# Patient Record
Sex: FEMALE | Race: WHITE | Employment: UNEMPLOYED | URBAN - METROPOLITAN AREA
[De-identification: names, ages, dates, MRNs, and addresses within clinical notes are randomized per-mention and may not be internally consistent; named-entity substitution may affect disease eponyms.]

---

## 2017-03-24 ENCOUNTER — TELEPHONE (OUTPATIENT)
Dept: GASTROENTEROLOGY | Facility: CLINIC | Age: 32
End: 2017-03-24
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
## 2017-03-24 NOTE — TELEPHONE ENCOUNTER
LMTCB. Advised no same day appts. If acute severe symptoms should present to ER. Cancellation spot next week is soonest available. Advised she can call PCP or [facility] to see if sooner available appts.       We do not have any [facility] record to lin

## 2017-03-24 NOTE — TELEPHONE ENCOUNTER
Unfortunately we have never had this patient seen in our clinic; she should contact her [facility] PCP for advice and/or referral to a GI that the PCP suggests and/or go to ER if sx worsen.

## 2017-03-24 NOTE — TELEPHONE ENCOUNTER
Pt states she is in severe pain and has not had a bowel movement. Pt is in tears and requesting to see someone - went to ER already and does not want to go back. Pls call - pt d/c call. Thank you.

## 2017-03-29 ENCOUNTER — OFFICE VISIT (OUTPATIENT)
Dept: OBGYN CLINIC | Facility: CLINIC | Age: 32
End: 2017-03-29

## 2017-03-29 VITALS
HEART RATE: 101 BPM | WEIGHT: 105 LBS | HEIGHT: 60 IN | SYSTOLIC BLOOD PRESSURE: 112 MMHG | DIASTOLIC BLOOD PRESSURE: 82 MMHG | BODY MASS INDEX: 20.62 KG/M2

## 2017-03-29 DIAGNOSIS — F41.9 ANXIETY: ICD-10-CM

## 2017-03-29 DIAGNOSIS — F17.200 SMOKER: ICD-10-CM

## 2017-03-29 DIAGNOSIS — Z30.011 BCP (BIRTH CONTROL PILLS) INITIATION: ICD-10-CM

## 2017-03-29 DIAGNOSIS — Z11.3 SCREEN FOR STD (SEXUALLY TRANSMITTED DISEASE): ICD-10-CM

## 2017-03-29 DIAGNOSIS — Z01.419 WELL FEMALE EXAM WITH ROUTINE GYNECOLOGICAL EXAM: Primary | ICD-10-CM

## 2017-03-29 PROCEDURE — 99395 PREV VISIT EST AGE 18-39: CPT | Performed by: ADVANCED PRACTICE MIDWIFE

## 2017-03-29 RX ORDER — LEVONORGESTREL AND ETHINYL ESTRADIOL 0.1-0.02MG
1 KIT ORAL DAILY
Qty: 1 PACKAGE | Refills: 11 | Status: SHIPPED | OUTPATIENT
Start: 2017-03-29 | End: 2017-04-05
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
## 2017-03-29 NOTE — PATIENT INSTRUCTIONS
ORAL CONTRACEPTIVE PILL INSTRUCTION    The name of my oral contraceptive pill is ____________loestrin 1/20__________________    When do I start my “pills”? 1. today    What time should I take my pill? 1. Take your pill the same time every day.   This will additional contraception? 1. Use a back-up with the first pack of pills  2. Use a back-up if you miss more than one pill in a pack  3. Use a back-up if you have bleeding during your active pills  4.  Use a back-up for safe sexual practice, to prevent disea

## 2017-03-30 LAB
C TRACH DNA SPEC QL NAA+PROBE: NEGATIVE
N GONORRHOEA DNA SPEC QL NAA+PROBE: NEGATIVE

## 2017-03-31 PROBLEM — F43.23 ADJUSTMENT DISORDER WITH MIXED ANXIETY AND DEPRESSED MOOD: Status: ACTIVE | Noted: 2017-03-31

## 2017-03-31 PROBLEM — M54.41 CHRONIC BILATERAL LOW BACK PAIN WITH BILATERAL SCIATICA: Status: ACTIVE | Noted: 2017-03-31

## 2017-03-31 PROBLEM — M51.26 PROTRUDED LUMBAR DISC: Status: ACTIVE | Noted: 2017-03-31

## 2017-03-31 PROBLEM — G89.29 CHRONIC BILATERAL LOW BACK PAIN WITH BILATERAL SCIATICA: Status: ACTIVE | Noted: 2017-03-31

## 2017-03-31 PROBLEM — F12.90 MARIJUANA USE: Status: ACTIVE | Noted: 2017-03-31

## 2017-03-31 PROBLEM — M54.42 CHRONIC BILATERAL LOW BACK PAIN WITH BILATERAL SCIATICA: Status: ACTIVE | Noted: 2017-03-31

## 2017-03-31 PROBLEM — F50.02 ANOREXIA NERVOSA WITH BULIMIA: Status: ACTIVE | Noted: 2017-03-31

## 2017-03-31 LAB — HPV I/H RISK 1 DNA SPEC QL NAA+PROBE: NEGATIVE

## 2017-04-05 ENCOUNTER — TELEPHONE (OUTPATIENT)
Dept: OBGYN CLINIC | Facility: CLINIC | Age: 32
End: 2017-04-05

## 2017-04-05 RX ORDER — LEVONORGESTREL AND ETHINYL ESTRADIOL 0.1-0.02MG
1 KIT ORAL DAILY
Qty: 1 PACKAGE | Refills: 11 | Status: SHIPPED | OUTPATIENT
Start: 2017-04-05 | End: 2018-10-08

## 2017-04-05 NOTE — TELEPHONE ENCOUNTER
Pt went to wrong pharmacy. Wants to change pharmacy to [facility] on [address] [facility]. Pharmacy changed in [address] and prescription sent.

## 2017-05-04 ENCOUNTER — TELEPHONE (OUTPATIENT)
Dept: OBGYN CLINIC | Facility: CLINIC | Age: 32
End: 2017-05-04

## 2017-05-04 NOTE — TELEPHONE ENCOUNTER
Pt returning call would like to khoa w nurse  Pt states she thought she completed all the labs in system pls call her to discuss. Okay to leave detailed vm if no answer.

## 2017-05-16 PROBLEM — K59.02 CONSTIPATION DUE TO PELVIC FLOOR OUTLET OBSTRUCTION: Status: ACTIVE | Noted: 2017-05-16

## 2017-05-16 PROBLEM — N94.89 HIGH-TONE PELVIC FLOOR DYSFUNCTION: Status: ACTIVE | Noted: 2017-05-16

## 2017-08-02 PROBLEM — S56.911A FOREARM STRAIN, RIGHT, INITIAL ENCOUNTER: Status: ACTIVE | Noted: 2017-08-02

## 2017-08-02 PROBLEM — S40.021A ARM CONTUSION, RIGHT, INITIAL ENCOUNTER: Status: ACTIVE | Noted: 2017-08-02

## 2018-10-08 DIAGNOSIS — Z30.011 BCP (BIRTH CONTROL PILLS) INITIATION: ICD-10-CM

## 2018-10-08 RX ORDER — LEVONORGESTREL AND ETHINYL ESTRADIOL 0.1-0.02MG
KIT ORAL
Qty: 28 TABLET | Refills: 0 | Status: SHIPPED | OUTPATIENT
Start: 2018-10-08 | End: 2019-01-24

## 2018-10-08 RX ORDER — LEVONORGESTREL AND ETHINYL ESTRADIOL 0.1-0.02MG
1 KIT ORAL DAILY
Qty: 1 PACKAGE | Refills: 11 | Status: SHIPPED | OUTPATIENT
Start: 2018-10-08 | End: 2019-01-24

## 2018-10-08 NOTE — TELEPHONE ENCOUNTER
Pt's last pap 03/29/2018. Refill placed for 1 Lessina.  Pt scheduled annual with [facility]jm for 10/15/2018 at 3:00 pm.

## 2018-10-15 ENCOUNTER — OFFICE VISIT (OUTPATIENT)
Dept: OBGYN CLINIC | Facility: CLINIC | Age: 33
End: 2018-10-15
Payer: COMMERCIAL

## 2018-10-15 VITALS
WEIGHT: 135.63 LBS | SYSTOLIC BLOOD PRESSURE: 112 MMHG | DIASTOLIC BLOOD PRESSURE: 64 MMHG | BODY MASS INDEX: 27.34 KG/M2 | HEIGHT: 59 IN

## 2018-10-15 DIAGNOSIS — N89.8 VAGINAL DISCHARGE: ICD-10-CM

## 2018-10-15 DIAGNOSIS — Z30.41 ENCOUNTER FOR BIRTH CONTROL PILLS MAINTENANCE: ICD-10-CM

## 2018-10-15 DIAGNOSIS — F17.200 TOBACCO USE DISORDER: ICD-10-CM

## 2018-10-15 DIAGNOSIS — Z01.419 WELL WOMAN EXAM WITH ROUTINE GYNECOLOGICAL EXAM: Primary | ICD-10-CM

## 2018-10-15 PROCEDURE — 99395 PREV VISIT EST AGE 18-39: CPT | Performed by: ADVANCED PRACTICE MIDWIFE

## 2018-10-15 PROCEDURE — 99406 BEHAV CHNG SMOKING 3-10 MIN: CPT | Performed by: ADVANCED PRACTICE MIDWIFE

## 2018-10-15 RX ORDER — LEVONORGESTREL AND ETHINYL ESTRADIOL 0.1-0.02MG
1 KIT ORAL DAILY
Qty: 3 PACKAGE | Refills: 4 | Status: SHIPPED | OUTPATIENT
Start: 2018-10-15 | End: 2019-01-24

## 2018-10-15 RX ORDER — CYCLOBENZAPRINE HCL 10 MG
TABLET ORAL
COMMUNITY
End: 2019-01-24

## 2018-10-15 RX ORDER — FLUCONAZOLE 150 MG/1
150 TABLET ORAL ONCE
Qty: 2 TABLET | Refills: 0 | Status: SHIPPED | OUTPATIENT
Start: 2018-10-15 | End: 2018-10-15

## 2018-10-15 NOTE — PROGRESS NOTES
HPI:    Patient ID: Abi Hadley is a 35year old female. Recently  is not in as much stress as last year. Menses normal on Lutera. Still taking Norco once monthly for severe back pain secondary to herniated disc.           Review of Syste Abnormality: See Interpretation/Result      INTERPRETATION/RESULT:   Low grade squamous intraepithelial lesion (LSIL)          HPV/Mild dysplasia/AMBIKA I                                  Fungal organisms morphologically consistent with Candida spp. Cerv/Endocerv/Vag 10/13/2015     Lab Results   Component Value Date    HPV1 POSITIVE (A) 10/13/2015         Current Outpatient Medications:  fluconazole (DIFLUCAN) 150 MG Oral Tab Take 1 tablet (150 mg total) by mouth once for 1 dose.  Disp: 2 tablet Rfl: 0 (DIFLUCAN) 150 MG Oral Tab Take 1 tablet (150 mg total) by mouth once for 1 dose. Disp: 2 tablet Rfl: 0   Levonorgestrel-Ethinyl Estrad (AVIANE) 0.1-20 MG-MCG Oral Tab Take 1 tablet by mouth daily.  Disp: 3 Package Rfl: 4   LESSINA 0.1-20 MG-MCG Oral Tab TA deviated, not enlarged and not tender. Cervix exhibits no motion tenderness, no discharge and no friability. Right adnexum displays no mass, no tenderness and no fullness. Left adnexum displays no mass, no tenderness and no fullness.  No vaginal discharge f

## 2018-10-15 NOTE — PATIENT INSTRUCTIONS
Getting Support for Quitting Smoking    You don’t have to go through the process of quitting smoking without support. Tell people you are quitting. The support of friends, coworkers, and family members can make a big difference.  Face-to-face or telepho Sometimes you may just need to talk when you miss smoking. Ex-smokers are good to talk to, because they’re likely to know how you feel. You may need extra support in the first few weeks after you quit.  Ask a friend to call you each day to see how you’re do

## 2019-01-24 ENCOUNTER — OFFICE VISIT (OUTPATIENT)
Dept: FAMILY MEDICINE CLINIC | Facility: CLINIC | Age: 34
End: 2019-01-24

## 2019-01-24 ENCOUNTER — OFFICE VISIT (OUTPATIENT)
Dept: OBGYN CLINIC | Facility: CLINIC | Age: 34
End: 2019-01-24
Payer: COMMERCIAL

## 2019-01-24 VITALS
RESPIRATION RATE: 12 BRPM | HEART RATE: 83 BPM | TEMPERATURE: 99 F | BODY MASS INDEX: 29.43 KG/M2 | DIASTOLIC BLOOD PRESSURE: 60 MMHG | HEIGHT: 59 IN | WEIGHT: 146 LBS | SYSTOLIC BLOOD PRESSURE: 94 MMHG | OXYGEN SATURATION: 98 %

## 2019-01-24 VITALS
BODY MASS INDEX: 28.47 KG/M2 | HEIGHT: 60 IN | WEIGHT: 145 LBS | SYSTOLIC BLOOD PRESSURE: 122 MMHG | DIASTOLIC BLOOD PRESSURE: 74 MMHG

## 2019-01-24 DIAGNOSIS — J45.998 POST-VIRAL REACTIVE AIRWAY DISEASE: ICD-10-CM

## 2019-01-24 DIAGNOSIS — J18.9 WALKING PNEUMONIA: Primary | ICD-10-CM

## 2019-01-24 DIAGNOSIS — N89.8 VAGINAL DISCHARGE: Primary | ICD-10-CM

## 2019-01-24 DIAGNOSIS — N91.5 OLIGOMENORRHEA, UNSPECIFIED TYPE: ICD-10-CM

## 2019-01-24 LAB
CONTROL LINE PRESENT WITH A CLEAR BACKGROUND (YES/NO): YES YES/NO
PREGNANCY TEST, URINE: NEGATIVE

## 2019-01-24 PROCEDURE — 81025 URINE PREGNANCY TEST: CPT | Performed by: FAMILY MEDICINE

## 2019-01-24 PROCEDURE — 99214 OFFICE O/P EST MOD 30 MIN: CPT | Performed by: FAMILY MEDICINE

## 2019-01-24 PROCEDURE — 99213 OFFICE O/P EST LOW 20 MIN: CPT | Performed by: OBSTETRICS & GYNECOLOGY

## 2019-01-24 RX ORDER — FLUTICASONE PROPIONATE 50 MCG
2 SPRAY, SUSPENSION (ML) NASAL DAILY
Qty: 1 BOTTLE | Refills: 0 | Status: SHIPPED | OUTPATIENT
Start: 2019-01-24 | End: 2019-03-14

## 2019-01-24 RX ORDER — MONTELUKAST SODIUM 10 MG/1
10 TABLET ORAL NIGHTLY
Qty: 30 TABLET | Refills: 0 | Status: SHIPPED | OUTPATIENT
Start: 2019-01-24 | End: 2019-02-20

## 2019-01-24 RX ORDER — BUDESONIDE AND FORMOTEROL FUMARATE DIHYDRATE 80; 4.5 UG/1; UG/1
2 AEROSOL RESPIRATORY (INHALATION) 2 TIMES DAILY
Qty: 1 INHALER | Refills: 0 | Status: SHIPPED | OUTPATIENT
Start: 2019-01-24 | End: 2019-02-20

## 2019-01-24 RX ORDER — AZITHROMYCIN 250 MG/1
TABLET, FILM COATED ORAL
Qty: 6 TABLET | Refills: 0 | Status: SHIPPED | OUTPATIENT
Start: 2019-01-24 | End: 2019-02-07

## 2019-01-24 RX ORDER — PROMETHAZINE HYDROCHLORIDE AND CODEINE PHOSPHATE 6.25; 1 MG/5ML; MG/5ML
5 SYRUP ORAL EVERY 6 HOURS PRN
Qty: 118 ML | Refills: 0 | Status: SHIPPED | OUTPATIENT
Start: 2019-01-24 | End: 2019-02-07

## 2019-01-25 NOTE — PROGRESS NOTES
HPI:    Patient ID: Janee Alexandra is a 35year old female. HPI  Gyne problem  My first time seeing pt  C/o vaginal discharge whitish gray. No odor. No itching or burning. Review of Systems   Constitutional: Negative. Cardiovascular: Negative. Grandfather    • Renal Disease Father         stones   • Diabetes Maternal Grandmother    • Diabetes Paternal Grandmother       Social History: Social History    Tobacco Use      Smoking status: Current Every Day Smoker        Packs/day: 0.50        Years:

## 2019-01-26 LAB
GENITAL VAGINOSIS SCREEN: NEGATIVE
TRICHOMONAS SCREEN: NEGATIVE

## 2019-01-31 NOTE — PROGRESS NOTES
HPI:   Patient presents with:  URI: c/c cough, chest congestion, runny nose on and off since October  No Period/no Cycle: c/c last period was in October per patient she took plan b back to back that month      Awa Urban is a 35year old female pr Medications:    Current Outpatient Medications:  azithromycin 250 MG Oral Tab Take two tablets by mouth today, then one daily. Disp: 6 tablet Rfl: 0   promethazine-codeine 6.25-10 MG/5ML Oral Syrup Take 5 mL by mouth every 6 (six) hours as needed.  Disp Grandmother    • Diabetes Paternal Grandmother           REVIEW OF SYSTEMS:   Review of Systems   Constitutional: Positive for chills. Negative for fatigue and fever. HENT: Positive for congestion, postnasal drip, rhinorrhea and sinus pain.     Respirator zpack, flonase, cough syrup  -start symbicort  -possible allergic component, will do start singulair  -s/p medrol wilman  -if no improvement obtain CXR  -obtain previous records (s/p [facility] x2 including normal CXR per pt)  -supportive care discussed  -red flags Sejal Chaves MD

## 2019-02-07 ENCOUNTER — OFFICE VISIT (OUTPATIENT)
Dept: FAMILY MEDICINE CLINIC | Facility: CLINIC | Age: 34
End: 2019-02-07

## 2019-02-07 VITALS
BODY MASS INDEX: 29.06 KG/M2 | RESPIRATION RATE: 12 BRPM | OXYGEN SATURATION: 97 % | TEMPERATURE: 99 F | HEART RATE: 93 BPM | DIASTOLIC BLOOD PRESSURE: 60 MMHG | WEIGHT: 148 LBS | SYSTOLIC BLOOD PRESSURE: 110 MMHG | HEIGHT: 60 IN

## 2019-02-07 DIAGNOSIS — J30.9 ALLERGIC RHINITIS WITH POSTNASAL DRIP: Primary | ICD-10-CM

## 2019-02-07 DIAGNOSIS — R09.82 ALLERGIC RHINITIS WITH POSTNASAL DRIP: Primary | ICD-10-CM

## 2019-02-07 PROCEDURE — 99213 OFFICE O/P EST LOW 20 MIN: CPT

## 2019-02-07 RX ORDER — LORATADINE 10 MG/1
10 TABLET ORAL DAILY
Qty: 30 TABLET | Refills: 0 | Status: SHIPPED | OUTPATIENT
Start: 2019-02-07 | End: 2019-03-14

## 2019-02-07 RX ORDER — LEVONORGESTREL AND ETHINYL ESTRADIOL 0.1-0.02MG
KIT ORAL
Refills: 3 | COMMUNITY
Start: 2019-02-04 | End: 2019-03-14

## 2019-02-08 NOTE — PROGRESS NOTES
HPI:    Patient ID: Tommy Looney is a 35year old female. Cough   This is a new problem. The current episode started more than 1 month ago. The problem has been unchanged. The problem occurs every few minutes. The cough is non-productive.  Associate Suspension 2 sprays by Each Nare route daily.  Disp: 1 Bottle Rfl: 0     Allergies:  Dander                    Grass                     Pine Bark [Pine]          Pollen                     PHYSICAL EXAM:   Physical Exam   Constitutional: She is oriented to

## 2019-02-20 ENCOUNTER — TELEPHONE (OUTPATIENT)
Dept: FAMILY MEDICINE CLINIC | Facility: CLINIC | Age: 34
End: 2019-02-20

## 2019-02-20 RX ORDER — MONTELUKAST SODIUM 10 MG/1
10 TABLET ORAL NIGHTLY
Qty: 90 TABLET | Refills: 0 | Status: SHIPPED | OUTPATIENT
Start: 2019-02-20 | End: 2019-03-14

## 2019-02-20 RX ORDER — BUDESONIDE AND FORMOTEROL FUMARATE DIHYDRATE 80; 4.5 UG/1; UG/1
2 AEROSOL RESPIRATORY (INHALATION) 2 TIMES DAILY
Qty: 1 INHALER | Refills: 0 | Status: SHIPPED | OUTPATIENT
Start: 2019-02-20 | End: 2019-03-14

## 2019-02-25 ENCOUNTER — LAB ENCOUNTER (OUTPATIENT)
Dept: LAB | Facility: HOSPITAL | Age: 34
End: 2019-02-25
Attending: FAMILY MEDICINE
Payer: COMMERCIAL

## 2019-02-25 DIAGNOSIS — N92.6 IRREGULAR MENSTRUAL CYCLE: ICD-10-CM

## 2019-02-25 DIAGNOSIS — Z00.00 HEALTHCARE MAINTENANCE: ICD-10-CM

## 2019-02-25 DIAGNOSIS — R61 NIGHT SWEATS: ICD-10-CM

## 2019-02-25 DIAGNOSIS — N93.9 ABNORMAL UTERINE BLEEDING (AUB): ICD-10-CM

## 2019-02-25 DIAGNOSIS — Z13.6 SCREENING FOR CARDIOVASCULAR CONDITION: ICD-10-CM

## 2019-02-25 LAB
ALBUMIN SERPL-MCNC: 4.3 G/DL (ref 3.4–5)
ALBUMIN/GLOB SERPL: 1.1 {RATIO} (ref 1–2)
ALP LIVER SERPL-CCNC: 60 U/L (ref 37–98)
ALT SERPL-CCNC: 18 U/L (ref 13–56)
ANION GAP SERPL CALC-SCNC: 5 MMOL/L (ref 0–18)
AST SERPL-CCNC: 12 U/L (ref 15–37)
BILIRUB SERPL-MCNC: 0.6 MG/DL (ref 0.1–2)
BUN BLD-MCNC: 13 MG/DL (ref 7–18)
BUN/CREAT SERPL: 13.1 (ref 10–20)
CALCIUM BLD-MCNC: 9.3 MG/DL (ref 8.5–10.1)
CHLORIDE SERPL-SCNC: 107 MMOL/L (ref 98–107)
CHOLEST SMN-MCNC: 179 MG/DL (ref ?–200)
CO2 SERPL-SCNC: 27 MMOL/L (ref 21–32)
CREAT BLD-MCNC: 0.99 MG/DL (ref 0.55–1.02)
DEPRECATED RDW RBC AUTO: 41.1 FL (ref 35.1–46.3)
ERYTHROCYTE [DISTWIDTH] IN BLOOD BY AUTOMATED COUNT: 12.7 % (ref 11–15)
EST. AVERAGE GLUCOSE BLD GHB EST-MCNC: 105 MG/DL (ref 68–126)
GLOBULIN PLAS-MCNC: 4 G/DL (ref 2.8–4.4)
GLUCOSE BLD-MCNC: 93 MG/DL (ref 70–99)
HBA1C MFR BLD HPLC: 5.3 % (ref ?–5.7)
HCT VFR BLD AUTO: 45.2 % (ref 35–48)
HDLC SERPL-MCNC: 50 MG/DL (ref 40–59)
HGB BLD-MCNC: 14.8 G/DL (ref 12–16)
LDLC SERPL CALC-MCNC: 117 MG/DL (ref ?–100)
M PROTEIN MFR SERPL ELPH: 8.3 G/DL (ref 6.4–8.2)
MCH RBC QN AUTO: 28.8 PG (ref 26–34)
MCHC RBC AUTO-ENTMCNC: 32.7 G/DL (ref 31–37)
MCV RBC AUTO: 87.9 FL (ref 80–100)
NONHDLC SERPL-MCNC: 129 MG/DL (ref ?–130)
OSMOLALITY SERPL CALC.SUM OF ELEC: 288 MOSM/KG (ref 275–295)
PLATELET # BLD AUTO: 416 10(3)UL (ref 150–450)
POTASSIUM SERPL-SCNC: 4 MMOL/L (ref 3.5–5.1)
RBC # BLD AUTO: 5.14 X10(6)UL (ref 3.8–5.3)
SODIUM SERPL-SCNC: 139 MMOL/L (ref 136–145)
T4 FREE SERPL-MCNC: 1.2 NG/DL (ref 0.8–1.7)
TRIGL SERPL-MCNC: 59 MG/DL (ref 30–149)
TSI SER-ACNC: 4.01 MIU/ML (ref 0.36–3.74)
VLDLC SERPL CALC-MCNC: 12 MG/DL (ref 0–30)
WBC # BLD AUTO: 12.3 X10(3) UL (ref 4–11)

## 2019-02-25 PROCEDURE — 81001 URINALYSIS AUTO W/SCOPE: CPT | Performed by: FAMILY MEDICINE

## 2019-02-25 PROCEDURE — 80061 LIPID PANEL: CPT

## 2019-02-25 PROCEDURE — 86480 TB TEST CELL IMMUN MEASURE: CPT

## 2019-02-25 PROCEDURE — 80053 COMPREHEN METABOLIC PANEL: CPT

## 2019-02-25 PROCEDURE — 83036 HEMOGLOBIN GLYCOSYLATED A1C: CPT

## 2019-02-25 PROCEDURE — 85027 COMPLETE CBC AUTOMATED: CPT

## 2019-02-25 PROCEDURE — 84439 ASSAY OF FREE THYROXINE: CPT

## 2019-02-25 PROCEDURE — 36415 COLL VENOUS BLD VENIPUNCTURE: CPT

## 2019-02-25 PROCEDURE — 84443 ASSAY THYROID STIM HORMONE: CPT

## 2019-02-25 NOTE — PROGRESS NOTES
Did not draw patient's blood. We did not have tubes needed. Only UA was sent our. Patient states she will go to [facility] today for blood work.

## 2019-02-25 NOTE — PROGRESS NOTES
HPI:   Patient presents with:   Anxiety/Panic attack (neurologic): Patient presents for frequent panic attacks  Other: Patient present for possible pre menopause      Ion Law is a 35year old female presenting for:    Irregular menses  -used to Date/Time    GLU 93 02/25/2019 05:03 PM     02/25/2019 05:03 PM    K 4.0 02/25/2019 05:03 PM     02/25/2019 05:03 PM    CO2 27.0 02/25/2019 05:03 PM    CREATSERUM 0.99 02/25/2019 05:03 PM    CA 9.3 02/25/2019 05:03 PM    ALB 4.3 02/25/2019 05:0 Packs/day: 0.50        Years: 12.00        Pack years: 6        Types: Cigarettes      Smokeless tobacco: Never Used      Tobacco comment: has cut down from 1 ppd    Alcohol use: No      Alcohol/week: 0.0 oz    Drug use: No      Comment: marijuana     Fami There is no tenderness. Musculoskeletal: She exhibits no edema. Psychiatric: She has a normal mood and affect. Vitals reviewed. ASSESSMENT AND PLAN:   Patient is a 35year old female who presents primarily presents for:    1.  OLIVA (generalized Refills   • escitalopram 10 MG Oral Tab 30 tablet 1     Sig: Take 1 tablet (10 mg total) by mouth daily. • ALPRAZolam 0.25 MG Oral Tab 15 tablet 0     Sig: Take 1 tablet (0.25 mg total) by mouth daily as needed for Anxiety.        Orders Placed This Encou

## 2019-02-27 ENCOUNTER — TELEPHONE (OUTPATIENT)
Dept: FAMILY MEDICINE CLINIC | Facility: CLINIC | Age: 34
End: 2019-02-27

## 2019-02-27 LAB
M TB IFN-G CD4+ T-CELLS BLD-ACNC: 0.02 IU/ML
M TB TUBERC IFN-G BLD QL: NEGATIVE
M TB TUBERC IGNF/MITOGEN IGNF CONTROL: >10 IU/ML
QUANTIFERON TB1 MINUS NIL: -0.01 IU/ML
QUANTIFERON TB2 MINUS NIL: -0.01 IU/ML

## 2019-02-27 NOTE — TELEPHONE ENCOUNTER
Lab in process is a duplicate orders all results are back      Called pt no answer, left message to call back

## 2019-02-27 NOTE — TELEPHONE ENCOUNTER
Patient called regarding her lab results from last visit, 02/25/2019 and would like to speak to the nurse, patient is swared that one of the test is in process Thank you. negative...

## 2019-02-28 PROBLEM — N93.9 ABNORMAL UTERINE BLEEDING (AUB): Status: ACTIVE | Noted: 2019-02-28

## 2019-02-28 PROBLEM — F41.1 GAD (GENERALIZED ANXIETY DISORDER): Status: ACTIVE | Noted: 2019-02-28

## 2019-03-01 ENCOUNTER — TELEPHONE (OUTPATIENT)
Dept: FAMILY MEDICINE CLINIC | Facility: CLINIC | Age: 34
End: 2019-03-01

## 2019-03-01 NOTE — TELEPHONE ENCOUNTER
Results were discussed with patient and she verbalized understanding, she is aware new Rx will be sent to [facility] as per Dr Latricia Alpers recommendation.

## 2019-03-04 ENCOUNTER — TELEPHONE (OUTPATIENT)
Dept: FAMILY MEDICINE CLINIC | Facility: CLINIC | Age: 34
End: 2019-03-04

## 2019-03-04 DIAGNOSIS — E03.8 SUBCLINICAL HYPOTHYROIDISM: Primary | ICD-10-CM

## 2019-03-04 RX ORDER — LEVOTHYROXINE SODIUM 0.05 MG/1
50 TABLET ORAL
Qty: 90 TABLET | Refills: 0 | Status: SHIPPED | OUTPATIENT
Start: 2019-03-04 | End: 2019-10-08

## 2019-03-04 NOTE — TELEPHONE ENCOUNTER
Patient is calling stating she spoke to Dr. Adry Galindo in regards to her thyroid and was told there will be a medication prescribe. She states she went to the pharmacy but nothing was called in. Can we resend medication.      She is also asking for referral t

## 2019-03-04 NOTE — TELEPHONE ENCOUNTER
Attempted to call pt. No answer. Left VM. Will call again later today or await pt callback. No medication was sent as results need to be disussed w/ pt as she ONLY has subclinical hypothyrodism thus medication is not truly warranted.

## 2019-03-07 ENCOUNTER — OFFICE VISIT (OUTPATIENT)
Dept: OBGYN CLINIC | Facility: CLINIC | Age: 34
End: 2019-03-07

## 2019-03-07 VITALS — SYSTOLIC BLOOD PRESSURE: 112 MMHG | BODY MASS INDEX: 28 KG/M2 | DIASTOLIC BLOOD PRESSURE: 72 MMHG | WEIGHT: 141 LBS

## 2019-03-07 DIAGNOSIS — N92.6 IRREGULAR MENSES: ICD-10-CM

## 2019-03-07 DIAGNOSIS — E03.9 HYPOTHYROIDISM, UNSPECIFIED TYPE: ICD-10-CM

## 2019-03-07 DIAGNOSIS — N91.2 AMENORRHEA: Primary | ICD-10-CM

## 2019-03-07 PROCEDURE — 99214 OFFICE O/P EST MOD 30 MIN: CPT | Performed by: OBSTETRICS & GYNECOLOGY

## 2019-03-07 RX ORDER — HYDROCODONE BITARTRATE AND ACETAMINOPHEN 5; 325 MG/1; MG/1
TABLET ORAL
Refills: 0 | COMMUNITY
Start: 2019-03-01 | End: 2019-06-04

## 2019-03-07 RX ORDER — MEDROXYPROGESTERONE ACETATE 5 MG/1
5 TABLET ORAL DAILY
Qty: 7 TABLET | Refills: 2 | Status: SHIPPED | OUTPATIENT
Start: 2019-03-07 | End: 2019-03-14

## 2019-03-07 NOTE — PROGRESS NOTES
HPI:   Tabatha Rodríguez is a 35year old female who presents for amenorrhea since sep 2018. Pt took multiple preg tests and all negative. Recentdly dx with subclinical hypothyroidsm and on meds. Pt counseled extensively, all questions answered.  Pt req p tablet Rfl: 0   Fluticasone Propionate 50 MCG/ACT Nasal Suspension 2 sprays by Each Nare route daily.  Disp: 1 Bottle Rfl: 0      Past Medical History:   Diagnosis Date   • Arthritis    • H/O colonoscopy 4/19/17    normal, repeat age 48 [facility]   • Herniated di is 27.54 kg/m².    GENERAL: well developed, well nourished,in no apparent distress  SKIN: no rashes,no suspicious lesions  HEENT: atraumatic, normocephalic  NECK: supple,no adenopathy  CHEST: no chest tenderness  LUNGS: clear to auscultation  CARDIO: RRR wi

## 2019-03-13 ENCOUNTER — TELEPHONE (OUTPATIENT)
Dept: OBGYN CLINIC | Facility: CLINIC | Age: 34
End: 2019-03-13

## 2019-03-13 NOTE — TELEPHONE ENCOUNTER
RN returned call to patient. Per patient was prescribed Provera by MEHNAZ to initiate menses. Pt is on day 4 or 5 of taking the medication and is concerned as vaginal bleeding has not started.  This RN advises patient that she can expect bleeding to begin in t

## 2019-03-14 ENCOUNTER — OFFICE VISIT (OUTPATIENT)
Dept: FAMILY MEDICINE CLINIC | Facility: CLINIC | Age: 34
End: 2019-03-14

## 2019-03-14 ENCOUNTER — HOSPITAL ENCOUNTER (OUTPATIENT)
Dept: ULTRASOUND IMAGING | Age: 34
Discharge: HOME | End: 2019-03-14
Attending: FAMILY MEDICINE
Payer: COMMERCIAL

## 2019-03-14 VITALS
HEART RATE: 102 BPM | WEIGHT: 146 LBS | SYSTOLIC BLOOD PRESSURE: 116 MMHG | HEIGHT: 60 IN | TEMPERATURE: 99 F | BODY MASS INDEX: 28.66 KG/M2 | OXYGEN SATURATION: 98 % | DIASTOLIC BLOOD PRESSURE: 74 MMHG

## 2019-03-14 DIAGNOSIS — R09.82 ALLERGIC RHINITIS WITH POSTNASAL DRIP: ICD-10-CM

## 2019-03-14 DIAGNOSIS — E03.8 SUBCLINICAL HYPOTHYROIDISM: ICD-10-CM

## 2019-03-14 DIAGNOSIS — J30.9 ALLERGIC RHINITIS WITH POSTNASAL DRIP: ICD-10-CM

## 2019-03-14 DIAGNOSIS — R05.8 RECURRENT COUGH: Primary | ICD-10-CM

## 2019-03-14 PROCEDURE — 76536 US EXAM OF HEAD AND NECK: CPT | Performed by: FAMILY MEDICINE

## 2019-03-14 PROCEDURE — 99213 OFFICE O/P EST LOW 20 MIN: CPT | Performed by: FAMILY MEDICINE

## 2019-03-14 RX ORDER — FLUTICASONE PROPIONATE 50 MCG
2 SPRAY, SUSPENSION (ML) NASAL DAILY
Qty: 1 BOTTLE | Refills: 0 | Status: SHIPPED | OUTPATIENT
Start: 2019-03-14 | End: 2019-06-04

## 2019-03-14 RX ORDER — LORATADINE 10 MG/1
10 TABLET ORAL DAILY
Qty: 30 TABLET | Refills: 2 | Status: SHIPPED | OUTPATIENT
Start: 2019-03-14 | End: 2019-06-04

## 2019-03-14 NOTE — PROGRESS NOTES
HPI:   Patient presents with:  Cough      Bryan Galan is a 35year old female presenting for:  Cough  -on and off for 4-6mo  -almost daily  -mainly dry  -no f/c, congestion  -some runny nose, postnasal drip  -no GERD sx  -smokes 1/2 pack xday for 1 Quantiferon TB1 minus NIL -0.01 IU/mL    Quantiferon TB2 minus NIL -0.01 IU/mL    Quantiferon TB Mitogen minus NIL >10.00 IU/mL    Quantiferon TB Result Negative Negative   T4, FREE (S)   Result Value Ref Range    Free T4 1.2 0.8 - 1.7 ng/dL       Labs: for malignant neoplasm of cervix 1/20/2014    per NG         Past Surgical History:   Procedure Laterality Date   • D & C  2016   • ELECTROCARDIOGRAM, COMPLETE  1/20/2014    scan to media tab       [facility].   Left Ear: Tympanic membrane normal.   Nose: Nose normal.   Mouth/Throat: Oropharynx is clear and moist.   Eyes: Conjunctivae are normal. Pupils are equal, round, and reactive to light.    Cardiovascular: Normal rate, regular rhythm and no 09/01/2018  Tobacco Cessation Counseling 2 Years due on 03/29/2019  Annual Depression Screen due on 10/15/2019  Annual Physical due on 10/15/2019  Pap Smear,3 Years due on 10/15/2021      Marianna Yepez MD

## 2019-03-18 ENCOUNTER — TELEPHONE (OUTPATIENT)
Dept: FAMILY MEDICINE CLINIC | Facility: CLINIC | Age: 34
End: 2019-03-18

## 2019-04-03 DIAGNOSIS — F41.1 GAD (GENERALIZED ANXIETY DISORDER): ICD-10-CM

## 2019-04-03 DIAGNOSIS — E03.8 SUBCLINICAL HYPOTHYROIDISM: ICD-10-CM

## 2019-04-03 RX ORDER — ESCITALOPRAM OXALATE 10 MG/1
TABLET ORAL
Qty: 30 TABLET | Refills: 1 | OUTPATIENT
Start: 2019-04-03

## 2019-04-03 RX ORDER — LEVOTHYROXINE SODIUM 0.05 MG/1
50 TABLET ORAL
Qty: 90 TABLET | Refills: 0 | OUTPATIENT
Start: 2019-04-03

## 2019-04-04 ENCOUNTER — TELEPHONE (OUTPATIENT)
Dept: FAMILY MEDICINE CLINIC | Facility: CLINIC | Age: 34
End: 2019-04-04

## 2019-04-04 NOTE — TELEPHONE ENCOUNTER
Call pt regarding med refill. informed pt that she would need to make a f/u apt with Dr Merrilee Osgood. Pt verbalized understanding and will call back to schedule apt.

## 2019-05-21 ENCOUNTER — TELEPHONE (OUTPATIENT)
Dept: FAMILY MEDICINE CLINIC | Facility: CLINIC | Age: 34
End: 2019-05-21

## 2019-06-04 ENCOUNTER — OFFICE VISIT (OUTPATIENT)
Dept: FAMILY MEDICINE CLINIC | Facility: CLINIC | Age: 34
End: 2019-06-04

## 2019-06-04 VITALS
HEART RATE: 98 BPM | WEIGHT: 140 LBS | SYSTOLIC BLOOD PRESSURE: 100 MMHG | BODY MASS INDEX: 27.48 KG/M2 | OXYGEN SATURATION: 99 % | HEIGHT: 60 IN | DIASTOLIC BLOOD PRESSURE: 60 MMHG

## 2019-06-04 DIAGNOSIS — N93.9 ABNORMAL UTERINE BLEEDING (AUB): Primary | ICD-10-CM

## 2019-06-04 DIAGNOSIS — E03.8 SUBCLINICAL HYPOTHYROIDISM: ICD-10-CM

## 2019-06-04 DIAGNOSIS — N92.1 MENORRHAGIA WITH IRREGULAR CYCLE: ICD-10-CM

## 2019-06-04 PROCEDURE — 99214 OFFICE O/P EST MOD 30 MIN: CPT | Performed by: FAMILY MEDICINE

## 2019-06-04 PROCEDURE — 36415 COLL VENOUS BLD VENIPUNCTURE: CPT | Performed by: FAMILY MEDICINE

## 2019-06-04 PROCEDURE — 84481 FREE ASSAY (FT-3): CPT | Performed by: FAMILY MEDICINE

## 2019-06-04 PROCEDURE — 84443 ASSAY THYROID STIM HORMONE: CPT | Performed by: FAMILY MEDICINE

## 2019-06-04 PROCEDURE — 81025 URINE PREGNANCY TEST: CPT | Performed by: FAMILY MEDICINE

## 2019-06-04 RX ORDER — MONTELUKAST SODIUM 10 MG/1
10 TABLET ORAL NIGHTLY
Qty: 90 TABLET | Refills: 0 | Status: SHIPPED | OUTPATIENT
Start: 2019-06-04 | End: 2019-09-24

## 2019-06-04 NOTE — PROGRESS NOTES
HPI:   Patient presents with:  Irregular Periods: still having irregular bleeding      Jamal Child is a 29year old female presenting for:    Irregular cycle  -initially no cycle for 6mo and used Provera on 3/19  -since being on provera has bee Take 1 tablet (50 mcg total) by mouth before breakfast. Disp: 90 tablet Rfl: 0   ALPRAZolam 0.25 MG Oral Tab Take 1 tablet (0.25 mg total) by mouth daily as needed for Anxiety.  Disp: 15 tablet Rfl: 0   ibuprofen 600 MG Oral Tab Take 1 tablet (600 mg total) Gastrointestinal: Negative for abdominal pain, constipation, diarrhea and vomiting. Genitourinary: Positive for menstrual problem and vaginal bleeding. Neurological: Negative for headaches.           PHYSICAL EXAM:   /60   Pulse 98   Ht 60\"   W complications from the treatments . Red flags/ ER precautions discussed.     Meds & Refills for this Visit:  Requested Prescriptions     Signed Prescriptions Disp Refills   • Montelukast Sodium 10 MG Oral Tab 90 tablet 0     Sig: Take 1 tablet (10 mg total

## 2019-06-06 ENCOUNTER — HOSPITAL ENCOUNTER (EMERGENCY)
Facility: HOSPITAL | Age: 34
Discharge: HOME | End: 2019-06-06
Attending: PHYSICIAN ASSISTANT
Payer: COMMERCIAL

## 2019-06-06 ENCOUNTER — TELEPHONE (OUTPATIENT)
Dept: FAMILY MEDICINE CLINIC | Facility: CLINIC | Age: 34
End: 2019-06-06

## 2019-06-06 ENCOUNTER — APPOINTMENT (OUTPATIENT)
Dept: ULTRASOUND IMAGING | Facility: HOSPITAL | Age: 34
End: 2019-06-06
Attending: PHYSICIAN ASSISTANT
Payer: COMMERCIAL

## 2019-06-06 ENCOUNTER — TELEPHONE (OUTPATIENT)
Dept: OBGYN CLINIC | Facility: CLINIC | Age: 34
End: 2019-06-06

## 2019-06-06 VITALS
WEIGHT: 140 LBS | RESPIRATION RATE: 18 BRPM | SYSTOLIC BLOOD PRESSURE: 101 MMHG | TEMPERATURE: 97 F | BODY MASS INDEX: 27.48 KG/M2 | DIASTOLIC BLOOD PRESSURE: 71 MMHG | HEIGHT: 60 IN | OXYGEN SATURATION: 99 % | HEART RATE: 66 BPM

## 2019-06-06 DIAGNOSIS — N39.0 URINARY TRACT INFECTION WITH HEMATURIA, SITE UNSPECIFIED: ICD-10-CM

## 2019-06-06 DIAGNOSIS — N83.201 RIGHT OVARIAN CYST: ICD-10-CM

## 2019-06-06 DIAGNOSIS — N93.9 ABNORMAL UTERINE BLEEDING: Primary | ICD-10-CM

## 2019-06-06 DIAGNOSIS — R31.9 URINARY TRACT INFECTION WITH HEMATURIA, SITE UNSPECIFIED: ICD-10-CM

## 2019-06-06 PROCEDURE — 93975 VASCULAR STUDY: CPT | Performed by: PHYSICIAN ASSISTANT

## 2019-06-06 PROCEDURE — 86900 BLOOD TYPING SEROLOGIC ABO: CPT | Performed by: PHYSICIAN ASSISTANT

## 2019-06-06 PROCEDURE — 87086 URINE CULTURE/COLONY COUNT: CPT | Performed by: PHYSICIAN ASSISTANT

## 2019-06-06 PROCEDURE — 87808 TRICHOMONAS ASSAY W/OPTIC: CPT | Performed by: PHYSICIAN ASSISTANT

## 2019-06-06 PROCEDURE — 87205 SMEAR GRAM STAIN: CPT | Performed by: PHYSICIAN ASSISTANT

## 2019-06-06 PROCEDURE — 87106 FUNGI IDENTIFICATION YEAST: CPT | Performed by: PHYSICIAN ASSISTANT

## 2019-06-06 PROCEDURE — 85025 COMPLETE CBC W/AUTO DIFF WBC: CPT | Performed by: PHYSICIAN ASSISTANT

## 2019-06-06 PROCEDURE — 76830 TRANSVAGINAL US NON-OB: CPT | Performed by: PHYSICIAN ASSISTANT

## 2019-06-06 PROCEDURE — 80048 BASIC METABOLIC PNL TOTAL CA: CPT | Performed by: PHYSICIAN ASSISTANT

## 2019-06-06 PROCEDURE — 81001 URINALYSIS AUTO W/SCOPE: CPT | Performed by: PHYSICIAN ASSISTANT

## 2019-06-06 PROCEDURE — 81025 URINE PREGNANCY TEST: CPT

## 2019-06-06 PROCEDURE — 96374 THER/PROPH/DIAG INJ IV PUSH: CPT

## 2019-06-06 PROCEDURE — 76856 US EXAM PELVIC COMPLETE: CPT | Performed by: PHYSICIAN ASSISTANT

## 2019-06-06 PROCEDURE — 99284 EMERGENCY DEPT VISIT MOD MDM: CPT

## 2019-06-06 PROCEDURE — 87491 CHLMYD TRACH DNA AMP PROBE: CPT | Performed by: PHYSICIAN ASSISTANT

## 2019-06-06 PROCEDURE — 86901 BLOOD TYPING SEROLOGIC RH(D): CPT | Performed by: PHYSICIAN ASSISTANT

## 2019-06-06 PROCEDURE — 87591 N.GONORRHOEAE DNA AMP PROB: CPT | Performed by: PHYSICIAN ASSISTANT

## 2019-06-06 PROCEDURE — 86850 RBC ANTIBODY SCREEN: CPT | Performed by: PHYSICIAN ASSISTANT

## 2019-06-06 PROCEDURE — 96361 HYDRATE IV INFUSION ADD-ON: CPT

## 2019-06-06 RX ORDER — IBUPROFEN 600 MG/1
TABLET ORAL
Qty: 20 TABLET | Refills: 0 | Status: SHIPPED | OUTPATIENT
Start: 2019-06-06

## 2019-06-06 RX ORDER — CEPHALEXIN 500 MG/1
500 CAPSULE ORAL 3 TIMES DAILY
Qty: 21 CAPSULE | Refills: 0 | Status: SHIPPED | OUTPATIENT
Start: 2019-06-06 | End: 2019-06-13

## 2019-06-06 RX ORDER — KETOROLAC TROMETHAMINE 30 MG/ML
30 INJECTION, SOLUTION INTRAMUSCULAR; INTRAVENOUS ONCE
Status: COMPLETED | OUTPATIENT
Start: 2019-06-06 | End: 2019-06-06

## 2019-06-06 RX ORDER — MEDROXYPROGESTERONE ACETATE 10 MG/1
10 TABLET ORAL DAILY
Qty: 10 TABLET | Refills: 0 | Status: SHIPPED | OUTPATIENT
Start: 2019-06-06 | End: 2019-06-16

## 2019-06-06 NOTE — TELEPHONE ENCOUNTER
Per pt she has been bleeding continuous for the past 2 months, states now is changing her tampon every 20 min and states feels weak.  Please advise

## 2019-06-06 NOTE — TELEPHONE ENCOUNTER
Per patient she is soaked in blood and is bleeding profusely. Pads/tampons completely soaked. Patient very weak and feels faint. Was going to advise ER, patient states she is already on her way.

## 2019-06-06 NOTE — ED PROVIDER NOTES
Patient Seen in: [facility] AND [facility] Emergency Department    History   Patient presents with:  Eval-G (genital)    Stated Complaint: vag bleed    HPI    Tony Malin is a 29year old female who presents with chief complaint of vaginal bleeding.   O Paternal Grandfather    • Renal Disease Father         stones   • Diabetes Maternal Grandmother    • Diabetes Paternal Grandmother        Social History    Tobacco Use      Smoking status: Current Every Day Smoker        Packs/day: 0.50        Years: 12.00 Capillary refill is brisk. No extremity edema. Gastrointestinal: Positive tenderness palpation at suprapubic area. Abdomen soft, nondistended. There is no rebound tenderness or guarding. No organomegaly is noted. No peritoneal signs.   Normal bowel soun Please view results for these tests on the individual orders. TYPE AND SCREEN    Narrative: The following orders were created for panel order TYPE AND SCREEN.   Procedure                               Abnormality         Status infection with hematuria, site unspecified  Right ovarian cyst    Disposition:  Discharge    Follow-up:  Samantha Irwin, [address] [facility] [id number]    Schedule an appointment as soon as possible for a visit in 2 days

## 2019-06-06 NOTE — TELEPHONE ENCOUNTER
Patient called for results, results below given        Notes recorded by Samantha Irwin MD on 6/5/2019 at 7:09 PM CDT  Please let her know:    Your thyroid levels are normal.  Since you stopped the medication, it looks like its ok for you to con

## 2019-06-06 NOTE — TELEPHONE ENCOUNTER
Has been bleeding continuously for the past two months and a half. Is starting to feel dizzy and weak and believes this may be due to the amount of blood that she is losing. Advised pt to go to ED immediately due to her symptoms.  Pt verbalized understandin

## 2019-06-06 NOTE — ED NOTES
Pt to ER with c/o increased pelvic pain and heavy vaginal bleeding with clots. Pt states she has been having vaginal bleeding for over 2 months. Pt skin pink, warm, and dry. Pt appears anxious and tearful upon RN assessment. Pt c/o dizziness and dyspnea.  P

## 2019-06-11 ENCOUNTER — OFFICE VISIT (OUTPATIENT)
Dept: OBGYN CLINIC | Facility: CLINIC | Age: 34
End: 2019-06-11

## 2019-06-11 VITALS — WEIGHT: 142 LBS | DIASTOLIC BLOOD PRESSURE: 60 MMHG | SYSTOLIC BLOOD PRESSURE: 100 MMHG | BODY MASS INDEX: 28 KG/M2

## 2019-06-11 DIAGNOSIS — N83.202 CYST OF LEFT OVARY: Primary | ICD-10-CM

## 2019-06-11 DIAGNOSIS — R10.2 PELVIC PAIN: ICD-10-CM

## 2019-06-11 DIAGNOSIS — M62.89 PELVIC FLOOR DYSFUNCTION IN FEMALE: ICD-10-CM

## 2019-06-11 PROCEDURE — 99213 OFFICE O/P EST LOW 20 MIN: CPT | Performed by: OBSTETRICS & GYNECOLOGY

## 2019-06-11 NOTE — PROGRESS NOTES
HPI:   Dali Martin is a 29year old female who presents for a hx of pelvic/suprapubic pain, was seen in the er recently for dysfunctional uterine bleeding and was given an rx for provera.       1) UTI:  Started on keflex which the pt was only [place] tablet (50 mcg total) by mouth before breakfast. Disp: 90 tablet Rfl: 0   ALPRAZolam 0.25 MG Oral Tab Take 1 tablet (0.25 mg total) by mouth daily as needed for Anxiety.  Disp: 15 tablet Rfl: 0   medroxyPROGESTERone Acetate (PROVERA) 10 MG Oral Tab Take 1 t anxiety  HEMATOLOGIC: denies hx of anemia  ENDOCRINE: denies thyroid history  ALL/ASTHMA: denies hx of allergy or asthma    EXAM:   /60 (BP Location: Right arm, Patient Position: Sitting, Cuff Size: adult)   Wt 142 lb (64.4 kg)   LMP  (LMP Unknown)

## 2019-06-12 PROBLEM — R10.2 PELVIC PAIN: Status: ACTIVE | Noted: 2019-06-12

## 2019-06-12 PROBLEM — M62.89 PELVIC FLOOR DYSFUNCTION IN FEMALE: Status: ACTIVE | Noted: 2019-06-12

## 2019-06-12 PROBLEM — N83.202 CYST OF LEFT OVARY: Status: ACTIVE | Noted: 2019-06-12

## 2019-09-17 ENCOUNTER — OFFICE VISIT (OUTPATIENT)
Dept: OBGYN CLINIC | Facility: CLINIC | Age: 34
End: 2019-09-17
Payer: COMMERCIAL

## 2019-09-17 ENCOUNTER — APPOINTMENT (OUTPATIENT)
Dept: LAB | Facility: HOSPITAL | Age: 34
End: 2019-09-17
Attending: OBSTETRICS & GYNECOLOGY
Payer: COMMERCIAL

## 2019-09-17 VITALS
BODY MASS INDEX: 26.93 KG/M2 | WEIGHT: 137.19 LBS | DIASTOLIC BLOOD PRESSURE: 76 MMHG | SYSTOLIC BLOOD PRESSURE: 112 MMHG | HEIGHT: 60 IN

## 2019-09-17 DIAGNOSIS — N92.6 IRREGULAR MENSES: ICD-10-CM

## 2019-09-17 DIAGNOSIS — N91.5 HYPOMENORRHEA: ICD-10-CM

## 2019-09-17 DIAGNOSIS — N91.4 SECONDARY OLIGOMENORRHEA: Primary | ICD-10-CM

## 2019-09-17 DIAGNOSIS — N91.4 SECONDARY OLIGOMENORRHEA: ICD-10-CM

## 2019-09-17 DIAGNOSIS — N83.209 CYST OF OVARY, UNSPECIFIED LATERALITY: ICD-10-CM

## 2019-09-17 LAB
B-HCG SERPL-ACNC: <1 MIU/ML
FSH SERPL-ACNC: 4.9 MIU/ML
PROGEST SERPL-MCNC: 2.58 NG/ML
PROLACTIN SERPL-MCNC: 9 NG/ML
TSI SER-ACNC: 4.66 MIU/ML (ref 0.36–3.74)

## 2019-09-17 PROCEDURE — 36415 COLL VENOUS BLD VENIPUNCTURE: CPT

## 2019-09-17 PROCEDURE — 99213 OFFICE O/P EST LOW 20 MIN: CPT | Performed by: OBSTETRICS & GYNECOLOGY

## 2019-09-17 PROCEDURE — 84702 CHORIONIC GONADOTROPIN TEST: CPT

## 2019-09-17 PROCEDURE — 84146 ASSAY OF PROLACTIN: CPT

## 2019-09-17 PROCEDURE — 84144 ASSAY OF PROGESTERONE: CPT

## 2019-09-17 PROCEDURE — 83001 ASSAY OF GONADOTROPIN (FSH): CPT

## 2019-09-17 PROCEDURE — 84443 ASSAY THYROID STIM HORMONE: CPT

## 2019-09-17 RX ORDER — LORATADINE 10 MG/1
TABLET ORAL
COMMUNITY
End: 2019-09-24

## 2019-09-17 RX ORDER — BACLOFEN 20 MG/1
TABLET ORAL
COMMUNITY
End: 2019-09-17

## 2019-09-17 NOTE — PROGRESS NOTES
HPI:    Patient ID: Quinton Desai is a 29year old female. HPI  GYN problem visits. Complains of irregular menstrual cycles, last period on August 15 was very short for her and lights lasting 2 to 3 days. Her usual of 7 days.   She had gone 6 m pressure    • Scoliosis    • Screening for malignant neoplasm of cervix 1/20/2014    per NG      Past Surgical History:   Procedure Laterality Date   • D & C  2016   • ELECTROCARDIOGRAM, COMPLETE  1/20/2014    scan to media tab      Family History   Proble (primary encounter diagnosis)  Hypomenorrhea  Irregular menses    Rule out pregnancy  If negative needs hormonal work-up.   Order pelvic ultrasound as follow-up for ovarian cyst.  Last u/s showed 3 cm simple right     Meds This Visit:  Requested Prescriptio

## 2019-09-18 ENCOUNTER — TELEPHONE (OUTPATIENT)
Dept: OBGYN CLINIC | Facility: CLINIC | Age: 34
End: 2019-09-18

## 2019-09-18 NOTE — TELEPHONE ENCOUNTER
lmtcb    ----- Message from Alen Shane MD sent at 9/18/2019 10:59 AM CDT -----  Please inform that her hormonal testing shows low, underactive thyroid.   If she is taking levothyroxine as indicated in the record at 50 mcg, she should increase the dose

## 2019-09-19 NOTE — TELEPHONE ENCOUNTER
Informed pt of Dr. Saida Diaz message and recommendations below---    --- Message from Miguelito Rojas MD sent at 9/18/2019 10:59 AM CDT -----  Please inform that her hormonal testing shows low, underactive thyroid.   If she is taking levothyroxine as indicat

## 2019-09-24 ENCOUNTER — OFFICE VISIT (OUTPATIENT)
Dept: FAMILY MEDICINE CLINIC | Facility: CLINIC | Age: 34
End: 2019-09-24
Payer: COMMERCIAL

## 2019-09-24 VITALS
DIASTOLIC BLOOD PRESSURE: 60 MMHG | HEART RATE: 98 BPM | SYSTOLIC BLOOD PRESSURE: 100 MMHG | BODY MASS INDEX: 27 KG/M2 | WEIGHT: 137 LBS | OXYGEN SATURATION: 98 % | TEMPERATURE: 98 F

## 2019-09-24 DIAGNOSIS — J01.00 ACUTE NON-RECURRENT MAXILLARY SINUSITIS: Primary | ICD-10-CM

## 2019-09-24 DIAGNOSIS — R09.82 ALLERGIC RHINITIS WITH POSTNASAL DRIP: ICD-10-CM

## 2019-09-24 DIAGNOSIS — J30.9 ALLERGIC RHINITIS WITH POSTNASAL DRIP: ICD-10-CM

## 2019-09-24 PROCEDURE — 99213 OFFICE O/P EST LOW 20 MIN: CPT

## 2019-09-24 RX ORDER — AZITHROMYCIN 250 MG/1
TABLET, FILM COATED ORAL
Qty: 6 TABLET | Refills: 0 | Status: SHIPPED | OUTPATIENT
Start: 2019-09-24 | End: 2019-10-08

## 2019-09-24 RX ORDER — LORATADINE 10 MG/1
10 TABLET ORAL DAILY
Qty: 90 TABLET | Refills: 0 | Status: SHIPPED | OUTPATIENT
Start: 2019-09-24 | End: 2019-12-17 | Stop reason: ALTCHOICE

## 2019-09-24 RX ORDER — MONTELUKAST SODIUM 10 MG/1
10 TABLET ORAL NIGHTLY
Qty: 90 TABLET | Refills: 0 | Status: SHIPPED | OUTPATIENT
Start: 2019-09-24 | End: 2019-11-15 | Stop reason: ALTCHOICE

## 2019-09-24 NOTE — PROGRESS NOTES
HPI:    Patient ID: Roland Bradford is a 29year old female. Cough   This is a new problem. Episode onset: 10 days ago. The problem has been gradually worsening. The problem occurs every few minutes. The cough is productive of sputum.  Associated s with food Disp: 20 tablet Rfl: 0     Allergies:  Dander                    Grass                     Pine Bark [Pine]          Pollen                     PHYSICAL EXAM:   Physical Exam   Constitutional: She is oriented to person, place, and time.  She appea 0     Sig: Take 1 tablet (10 mg total) by mouth daily. • Montelukast Sodium 10 MG Oral Tab 90 tablet 0     Sig: Take 1 tablet (10 mg total) by mouth nightly.        Imaging & Referrals:  None       #[id number]

## 2019-09-27 ENCOUNTER — TELEPHONE (OUTPATIENT)
Dept: FAMILY MEDICINE CLINIC | Facility: CLINIC | Age: 34
End: 2019-09-27

## 2019-09-27 NOTE — TELEPHONE ENCOUNTER
Patient states Dr. Brandi Cook prescribed her the [address] 3 days ago but she's not feeling better and wants Dr. Lonzo Sandifer to send her ( steroids ). Please advise.

## 2019-10-08 ENCOUNTER — HOSPITAL ENCOUNTER (OUTPATIENT)
Dept: ULTRASOUND IMAGING | Age: 34
Discharge: HOME | End: 2019-10-08
Attending: OBSTETRICS & GYNECOLOGY
Payer: COMMERCIAL

## 2019-10-08 DIAGNOSIS — N83.209 CYST OF OVARY, UNSPECIFIED LATERALITY: ICD-10-CM

## 2019-10-08 PROCEDURE — 76830 TRANSVAGINAL US NON-OB: CPT | Performed by: OBSTETRICS & GYNECOLOGY

## 2019-10-08 PROCEDURE — 76856 US EXAM PELVIC COMPLETE: CPT | Performed by: OBSTETRICS & GYNECOLOGY

## 2019-10-08 NOTE — PROGRESS NOTES
HPI:   Patient presents with:   Insomnia: insomnia on and off since 2015  Thyroid Problem: thyroid follow up      Darlene Long is a 29year old female presenting for:    URI/Cold   -Symptoms for  1wk  -Patient reports dry cough,  Intermittent SOB 02/25/2019 05:03 PM    TSH 4.660 (H) 09/17/2019 04:52 PM    T4F 1.2 02/25/2019 05:03 PM          Medications:  predniSONE 20 MG Oral Tab, prednisone 20 mg tablet, Disp: , Rfl:   Fluticasone Propionate 50 MCG/ACT Nasal Suspension, SQUIRT 1 SPRAY IEN BID, Chandan Bonner Tobacco comment: has cut down from 1 ppd    Alcohol use: No      Alcohol/week: 0.0 standard drinks    Drug use: No      Comment: marijuana     Family History:  Family History   Problem Relation Age of Onset   • Diabetes Maternal Grandfather    • Heart At thyromegaly present. Cardiovascular: Normal rate, regular rhythm and normal heart sounds. No murmur heard. Edema not present. Pulmonary/Chest: Effort normal. No respiratory distress. She has wheezes (faint at bases). Abdominal: Soft.  Bowel sounds azithromycin 250 MG Oral Tab 6 tablet 0     Sig: Take two tablets by mouth today, then one daily. • predniSONE 20 MG Oral Tab 10 tablet 0     Sig: Take 2 tablets (40 mg total) by mouth daily for 5 days.    • Levothyroxine Sodium 75 MCG Oral Tab 90 tablet

## 2019-10-09 DIAGNOSIS — F41.1 GAD (GENERALIZED ANXIETY DISORDER): ICD-10-CM

## 2019-10-09 RX ORDER — ALPRAZOLAM 0.25 MG/1
0.25 TABLET ORAL DAILY PRN
Qty: 15 TABLET | Refills: 0 | Status: SHIPPED | OUTPATIENT
Start: 2019-10-09 | End: 2020-03-02

## 2019-10-09 NOTE — TELEPHONE ENCOUNTER
Patient is calling regarding refill on ALPRAZolam 0.25 MG Oral Tab. She stated she forgot to ask Dr. Marek Torres for a refill.

## 2019-10-10 ENCOUNTER — TELEPHONE (OUTPATIENT)
Dept: OBGYN CLINIC | Facility: CLINIC | Age: 34
End: 2019-10-10

## 2019-10-10 NOTE — TELEPHONE ENCOUNTER
Please inform u/s showed rt ovarian cyst is almost gone. Very small now 1.5 cm. Left with tiny 1 cm one. These are not concerning. Nothing further required. May take mild pain meds if desired.

## 2019-10-10 NOTE — TELEPHONE ENCOUNTER
Informed pt of message and Dr. Tomi Cross recommendations below-      Vanessa Hinton MD           10/10/19 10:48 AM   Note      Please inform u/s showed rt ovarian cyst is almost gone. Very small now 1.5 cm. Left with tiny 1 cm one.   These are not concer

## 2019-10-18 PROBLEM — F51.04 PSYCHOPHYSIOLOGICAL INSOMNIA: Status: ACTIVE | Noted: 2019-10-18

## 2019-10-28 ENCOUNTER — OFFICE VISIT (OUTPATIENT)
Dept: FAMILY MEDICINE CLINIC | Facility: CLINIC | Age: 34
End: 2019-10-28
Payer: COMMERCIAL

## 2019-10-28 VITALS
OXYGEN SATURATION: 99 % | HEIGHT: 60 IN | BODY MASS INDEX: 27.33 KG/M2 | SYSTOLIC BLOOD PRESSURE: 106 MMHG | HEART RATE: 90 BPM | WEIGHT: 139.19 LBS | DIASTOLIC BLOOD PRESSURE: 70 MMHG

## 2019-10-28 DIAGNOSIS — J20.9 BRONCHITIS WITH BRONCHOSPASM: Primary | ICD-10-CM

## 2019-10-28 DIAGNOSIS — J45.998 POST-VIRAL REACTIVE AIRWAY DISEASE: ICD-10-CM

## 2019-10-28 PROCEDURE — 99214 OFFICE O/P EST MOD 30 MIN: CPT | Performed by: FAMILY MEDICINE

## 2019-10-28 RX ORDER — PROMETHAZINE HYDROCHLORIDE AND CODEINE PHOSPHATE 6.25; 1 MG/5ML; MG/5ML
5 SOLUTION ORAL EVERY 6 HOURS PRN
Qty: 118 ML | Refills: 0 | Status: SHIPPED | OUTPATIENT
Start: 2019-10-28 | End: 2019-11-11

## 2019-10-28 RX ORDER — ALBUTEROL SULFATE 90 UG/1
1-2 AEROSOL, METERED RESPIRATORY (INHALATION)
Qty: 1 INHALER | Refills: 1 | Status: SHIPPED | OUTPATIENT
Start: 2019-10-28 | End: 2020-03-02

## 2019-10-28 RX ORDER — BUDESONIDE AND FORMOTEROL FUMARATE DIHYDRATE 80; 4.5 UG/1; UG/1
AEROSOL RESPIRATORY (INHALATION)
Qty: 1 INHALER | Refills: 0 | Status: SHIPPED | OUTPATIENT
Start: 2019-10-28 | End: 2020-01-16

## 2019-10-28 NOTE — PROGRESS NOTES
HPI:   Patient presents with: Follow - Up: Patient is here for follow up on ER visit for Bronchitis      Tony Malin is a 29year old female presenting for:    3wks ago seen for a cold.  Was traveling to [facility] and empirically given zpak and ac (L) 02/25/2019 05:03 PM    ALT 18 02/25/2019 05:03 PM    BILT 0.6 02/25/2019 05:03 PM    TSH 4.660 (H) 09/17/2019 04:52 PM    T4F 1.2 02/25/2019 05:03 PM          Medications:  Albuterol Sulfate HFA (PROAIR HFA) 108 (90 Base) MCG/ACT Inhalation Aero Soln, Smoking status: Current Every Day Smoker        Packs/day: 0.50        Years: 12.00        Pack years: 6        Types: Cigarettes      Smokeless tobacco: Never Used      Tobacco comment: has cut down from 1 ppd    Alcohol use: No      Alcohol/week: 0.0 sta She exhibits no tenderness. Abdominal: Soft. Bowel sounds are normal. She exhibits no distension. There is no tenderness. Neurological: No cranial nerve deficit.            ASSESSMENT AND PLAN:   Patient is a 29year old female who presents primarily pr PO BID   • Promethazine-Codeine 6.25-10 MG/5ML Oral Solution 118 mL 0     Sig: Take 5 mL by mouth every 6 (six) hours as needed (cough). No orders of the defined types were placed in this encounter.       Imaging & Consults:  None    [facility] Maintenanc

## 2019-10-29 ENCOUNTER — TELEPHONE (OUTPATIENT)
Dept: FAMILY MEDICINE CLINIC | Facility: CLINIC | Age: 34
End: 2019-10-29

## 2019-10-29 RX ORDER — PREDNISONE 20 MG/1
40 TABLET ORAL DAILY
Qty: 10 TABLET | Refills: 0 | Status: SHIPPED | OUTPATIENT
Start: 2019-10-29 | End: 2019-11-03

## 2019-10-29 NOTE — TELEPHONE ENCOUNTER
Pt called stating that the medications that where prescribed are not helping her. She has a lot more cough then yesterday and can barely take a breath. She asked if she could take a steroid?  Please call back pt and advise

## 2019-10-30 ENCOUNTER — TELEPHONE (OUTPATIENT)
Dept: FAMILY MEDICINE CLINIC | Facility: CLINIC | Age: 34
End: 2019-10-30

## 2019-10-30 NOTE — TELEPHONE ENCOUNTER
PT CALLED BACK STATING THAT SHE IS MISSING HER ANTIBIOTIC? SHE STATED SHE WAS TOLD BY THE DR THAT SHE WAS GOING TO GET AN ANTIBIOTIC.  PLEASE CALL AND ADVISE

## 2019-11-04 ENCOUNTER — TELEPHONE (OUTPATIENT)
Dept: FAMILY MEDICINE CLINIC | Facility: CLINIC | Age: 34
End: 2019-11-04

## 2019-11-04 RX ORDER — AZITHROMYCIN 250 MG/1
TABLET, FILM COATED ORAL
Qty: 6 TABLET | Refills: 0 | Status: SHIPPED | OUTPATIENT
Start: 2019-11-04 | End: 2019-12-17

## 2019-11-04 NOTE — TELEPHONE ENCOUNTER
Ok to send Kimberly Jeffery left message for patient informing her z-wilman had been sent to call us if she was not feeling better

## 2019-11-04 NOTE — TELEPHONE ENCOUNTER
Pt called stating that she still not feeling good, she is done with the steroids but her cough is still there. She is using the inhaler. Pt wants to know if she can get another med prescribed or if she has to come in and see Dr. Cole Mott.  Please call and a

## 2019-11-12 ENCOUNTER — TELEPHONE (OUTPATIENT)
Dept: FAMILY MEDICINE CLINIC | Facility: CLINIC | Age: 34
End: 2019-11-12

## 2019-11-12 NOTE — TELEPHONE ENCOUNTER
Patient is requesting refills for the following medications:    azithromycin (ZITHROMAX Z-GUILLAUME) 250 MG Oral Tab   Promethazine-Codeine 6.25-10 MG/5ML Oral Solution   predniSONE 20 MG Oral Tab     Patient is aware she may need an appointment.

## 2019-11-15 RX ORDER — MONTELUKAST SODIUM 10 MG/1
10 TABLET ORAL DAILY
Qty: 30 TABLET | Refills: 0 | Status: SHIPPED | OUTPATIENT
Start: 2019-11-15

## 2019-11-15 NOTE — TELEPHONE ENCOUNTER
Pt called requesting an appt with dr. Dewey Cifuentes for Monday, but she is completely booked. She wants to know what else to do if her cough is still lingering, she did took z-wilman and it helped her a lot, it decreased her cough but not completely.  Please call

## 2019-11-15 NOTE — TELEPHONE ENCOUNTER
Ok per Dr. Alan Varma to send Singulair 10 mg #30 to take daily. Per patient, her cough is more of a dry cough. Understood medication has been sent, and advised her to drink lots of fluids and run a humidifier in her room as well.    She verbalized understandi

## 2019-12-17 ENCOUNTER — OFFICE VISIT (OUTPATIENT)
Dept: OBGYN CLINIC | Facility: CLINIC | Age: 34
End: 2019-12-17
Payer: COMMERCIAL

## 2019-12-17 ENCOUNTER — OFFICE VISIT (OUTPATIENT)
Dept: FAMILY MEDICINE CLINIC | Facility: CLINIC | Age: 34
End: 2019-12-17
Payer: COMMERCIAL

## 2019-12-17 VITALS
BODY MASS INDEX: 28 KG/M2 | SYSTOLIC BLOOD PRESSURE: 104 MMHG | OXYGEN SATURATION: 97 % | WEIGHT: 142 LBS | HEART RATE: 79 BPM | DIASTOLIC BLOOD PRESSURE: 66 MMHG

## 2019-12-17 VITALS — BODY MASS INDEX: 28 KG/M2 | DIASTOLIC BLOOD PRESSURE: 68 MMHG | SYSTOLIC BLOOD PRESSURE: 116 MMHG | WEIGHT: 141.63 LBS

## 2019-12-17 DIAGNOSIS — N89.8 VAGINAL DISCHARGE: ICD-10-CM

## 2019-12-17 DIAGNOSIS — B35.3 TINEA PEDIS OF BOTH FEET: Primary | ICD-10-CM

## 2019-12-17 DIAGNOSIS — Z01.419 ENCOUNTER FOR WELL WOMAN EXAM WITH ROUTINE GYNECOLOGICAL EXAM: Primary | ICD-10-CM

## 2019-12-17 DIAGNOSIS — M77.12 LATERAL EPICONDYLITIS OF LEFT ELBOW: ICD-10-CM

## 2019-12-17 DIAGNOSIS — N83.202 BILATERAL OVARIAN CYSTS: ICD-10-CM

## 2019-12-17 DIAGNOSIS — R09.82 POSTNASAL DRIP: ICD-10-CM

## 2019-12-17 DIAGNOSIS — N83.201 BILATERAL OVARIAN CYSTS: ICD-10-CM

## 2019-12-17 PROCEDURE — 99395 PREV VISIT EST AGE 18-39: CPT | Performed by: ADVANCED PRACTICE MIDWIFE

## 2019-12-17 PROCEDURE — 99214 OFFICE O/P EST MOD 30 MIN: CPT | Performed by: FAMILY MEDICINE

## 2019-12-17 RX ORDER — FLUTICASONE PROPIONATE 50 MCG
1 SPRAY, SUSPENSION (ML) NASAL DAILY
Qty: 1 BOTTLE | Refills: 1 | Status: SHIPPED | OUTPATIENT
Start: 2019-12-17 | End: 2020-03-20

## 2019-12-17 RX ORDER — FLUCONAZOLE 150 MG/1
TABLET ORAL
Refills: 0 | COMMUNITY
Start: 2019-11-24 | End: 2020-03-02 | Stop reason: ALTCHOICE

## 2019-12-17 RX ORDER — LEVOTHYROXINE SODIUM 0.07 MG/1
TABLET ORAL
COMMUNITY
End: 2019-12-17

## 2019-12-17 RX ORDER — CYCLOBENZAPRINE HCL 10 MG
TABLET ORAL
Refills: 0 | COMMUNITY
Start: 2019-11-26

## 2019-12-17 RX ORDER — CLOTRIMAZOLE 1 %
1 CREAM (GRAM) TOPICAL 2 TIMES DAILY
Qty: 1 TUBE | Refills: 1 | Status: SHIPPED | OUTPATIENT
Start: 2019-12-17 | End: 2020-03-02

## 2019-12-17 RX ORDER — ONDANSETRON 4 MG/1
TABLET, FILM COATED ORAL
COMMUNITY

## 2019-12-17 RX ORDER — ESCITALOPRAM OXALATE 10 MG/1
TABLET ORAL
Refills: 0 | COMMUNITY
Start: 2019-10-09 | End: 2020-03-02

## 2019-12-17 NOTE — PATIENT INSTRUCTIONS
Tennis Elbow  Muscles connect to bones by thick, fibrous cords (tendons). When the muscles are overused by repeated motion, the tendons may become inflamed and painful. This condition is called tendonitis.   Tennis elbow (lateral epicondylitis) is a form night to ease symptoms in the morning. Your healthcare provider may recommend a special wrap or splint to compress the muscles of the forearm. This can ease pain during daytime activities. As your symptoms get better, start to move your elbow more.   · Put All rights reserved. This information is not intended as a substitute for professional medical care. Always follow your healthcare professional's instructions.         Athlete’s Foot    Athlete’s foot (tinea pedis) is caused by a fungal infection in the ski seek medical care  Get medical attention right away if any of the following occur:  · Fever of 100.4°F (38°C) or higher, or as directed  · Increasing redness or swelling of the foot  · Infection comes back soon after treatment  · Pus draining from cracks i

## 2019-12-17 NOTE — PATIENT INSTRUCTIONS
Nettipot solution at bedtime. Use nasal spray after cleansing with nettipot    Can change antihistamine to zyrtec.   Rx sent    Take a Multivitamin daily with 485 mcg of folic acid (folate)

## 2019-12-17 NOTE — PROGRESS NOTES
HPI:    Patient ID: Collette Coffer is a 29year old female. Here for annual exam.  Currently sexually active. Not using any contraception. Not planning a pregnancy but would be ok if she got pregnant.   Has a cyst on her ovary and has had some a intraepithelial lesion or malignancy   Final      Lab Results   Component Value Date    [place] Cerv/Endocerv/Vag 10/13/2015     Lab Results   Component Value Date    HPV1 POSITIVE (A) 10/13/2015       Current Outpatient Medications   Medication Sig Dispens screening 1/20/2014    per NG   • Low blood pressure    • Scoliosis    • Screening for malignant neoplasm of cervix 1/20/2014    per NG      Past Surgical History:   Procedure Laterality Date   • D & C  2016   • ELECTROCARDIOGRAM, COMPLETE  1/20/2014    sc Sulfate HFA (PROAIR HFA) 108 (90 Base) MCG/ACT Inhalation Aero Soln Inhale 1-2 puffs into the lungs every 4 to 6 hours as needed for Wheezing or Shortness of Breath.  1 Inhaler 1   • SYMBICORT 80-4.5 MCG/ACT Inhalation Aerosol INHALE 2 PUFFS PO BID 1 Inhale tenderness or lesion on the left labia. Uterus is not deviated, not enlarged and not tender. Cervix exhibits no motion tenderness, no discharge and no friability. Right adnexum displays no mass, no tenderness and no fullness.  Left adnexum displays no mass,

## 2019-12-17 NOTE — PROGRESS NOTES
CHIEF COMPLAINT: Patient presents with:  Cough: lingering since October  Derm Problem: pt suspects athletes foot; blistering left foot  Pain: left mid deltoid - elbow        HPI:     Huma Busby is a 29year old female presents for cough, athlet' 2016   • ELECTROCARDIOGRAM, COMPLETE  1/20/2014    scan to media tab      Family History   Problem Relation Age of Onset   • Diabetes Maternal Grandfather    • Heart Attack Maternal Grandfather    • Cancer Paternal Grandfather    • Diabetes Paternal Drew Guillermor Tab TK 1 T PO BID PRN  0   • Diclofenac Sodium 1 % Transdermal Gel APPLY TOPICALLY TO PAIN AREAS UTD EVERY DAY TO TWICE DAILY  1   • escitalopram 10 MG Oral Tab TK 1 T PO D  0   • fluconazole 150 MG Oral Tab   0   • Ondansetron HCl (ZOFRAN) 4 mg tablet Zof Cardiovascular: Normal rate, regular rhythm and normal heart sounds. Pulmonary/Chest: Effort normal and breath sounds normal. No respiratory distress. She has no wheezes. She has no rales. Lymphadenopathy:     She has no cervical adenopathy.    Neuro 1      Meds This Visit:  Requested Prescriptions     Signed Prescriptions Disp Refills   • Fluticasone Propionate 50 MCG/ACT Nasal Suspension 1 Bottle 1     Si spray by Nasal route daily.    • Elastic Bandages & Supports (TENNIS ELBOW NEOPRENE BRACE) Do Secondary oligomenorrhea     Hypomenorrhea     Irregular menses     Acute non-recurrent maxillary sinusitis     Psychophysiological insomnia      Imaging & Referrals:  None     12/17/2019  Michael Davis MD      Patient understands plan and follow

## 2020-01-02 ENCOUNTER — TELEPHONE (OUTPATIENT)
Dept: FAMILY MEDICINE CLINIC | Facility: CLINIC | Age: 35
End: 2020-01-02

## 2020-01-02 DIAGNOSIS — E03.8 SUBCLINICAL HYPOTHYROIDISM: ICD-10-CM

## 2020-01-02 DIAGNOSIS — R05.3 COUGH, PERSISTENT: Primary | ICD-10-CM

## 2020-01-02 NOTE — TELEPHONE ENCOUNTER
Patient is calling asking for a referral for an allergy specialist or pulmonary specialist.     Gissell Tello per Dr. Virgen Mcgee to authorized referral.

## 2020-01-03 RX ORDER — LEVOTHYROXINE SODIUM 0.07 MG/1
TABLET ORAL
Qty: 90 TABLET | Refills: 0 | Status: SHIPPED | OUTPATIENT
Start: 2020-01-03 | End: 2020-03-12

## 2020-01-07 NOTE — TELEPHONE ENCOUNTER
Patient is returning MA's phone call, patient verbalized understanding and will  to schedule an appt.

## 2020-01-07 NOTE — TELEPHONE ENCOUNTER
Referral entered and message left for patient to call Dr Janessa Bean office to schedule an appointment and his info was provided on the voicemail.

## 2020-01-16 ENCOUNTER — TELEPHONE (OUTPATIENT)
Dept: FAMILY MEDICINE CLINIC | Facility: CLINIC | Age: 35
End: 2020-01-16

## 2020-01-16 DIAGNOSIS — J45.998 POST-VIRAL REACTIVE AIRWAY DISEASE: ICD-10-CM

## 2020-01-16 DIAGNOSIS — J20.9 BRONCHITIS WITH BRONCHOSPASM: ICD-10-CM

## 2020-01-16 RX ORDER — BUDESONIDE AND FORMOTEROL FUMARATE DIHYDRATE 80; 4.5 UG/1; UG/1
AEROSOL RESPIRATORY (INHALATION)
Qty: 1 INHALER | Refills: 0 | Status: SHIPPED | OUTPATIENT
Start: 2020-01-16 | End: 2020-03-02

## 2020-02-21 ENCOUNTER — TELEPHONE (OUTPATIENT)
Dept: FAMILY MEDICINE CLINIC | Facility: CLINIC | Age: 35
End: 2020-02-21

## 2020-02-21 NOTE — TELEPHONE ENCOUNTER
Pt called stating that she was in the ER at [facility] two days ago for the severe pain that she has on her neck that goes down to her shoulders and left arm.  She only had xrays done and was told that she has nothing and was sent home with Ibuprofen 6

## 2020-02-21 NOTE — TELEPHONE ENCOUNTER
appt given to see Dr Merrilee Osgood on 03/02/2020, she was highly recommended to see Ortho but according to her she already called them and they need her to get an MRI ordered by her PCP.

## 2020-03-02 ENCOUNTER — OFFICE VISIT (OUTPATIENT)
Dept: FAMILY MEDICINE CLINIC | Facility: CLINIC | Age: 35
End: 2020-03-02
Payer: COMMERCIAL

## 2020-03-02 VITALS
OXYGEN SATURATION: 92 % | HEART RATE: 101 BPM | WEIGHT: 140 LBS | DIASTOLIC BLOOD PRESSURE: 66 MMHG | BODY MASS INDEX: 27 KG/M2 | SYSTOLIC BLOOD PRESSURE: 104 MMHG

## 2020-03-02 DIAGNOSIS — M77.12 LATERAL EPICONDYLITIS OF LEFT ELBOW: Primary | ICD-10-CM

## 2020-03-02 DIAGNOSIS — J45.998 POST-VIRAL REACTIVE AIRWAY DISEASE: ICD-10-CM

## 2020-03-02 DIAGNOSIS — E03.8 SUBCLINICAL HYPOTHYROIDISM: ICD-10-CM

## 2020-03-02 DIAGNOSIS — R09.82 POSTNASAL DRIP: ICD-10-CM

## 2020-03-02 DIAGNOSIS — F17.200 SMOKER: ICD-10-CM

## 2020-03-02 DIAGNOSIS — Z00.00 HEALTHCARE MAINTENANCE: ICD-10-CM

## 2020-03-02 PROCEDURE — 99214 OFFICE O/P EST MOD 30 MIN: CPT | Performed by: FAMILY MEDICINE

## 2020-03-02 PROCEDURE — 99406 BEHAV CHNG SMOKING 3-10 MIN: CPT | Performed by: FAMILY MEDICINE

## 2020-03-02 RX ORDER — ALBUTEROL SULFATE 90 UG/1
1-2 AEROSOL, METERED RESPIRATORY (INHALATION)
Qty: 1 INHALER | Refills: 1 | Status: SHIPPED | OUTPATIENT
Start: 2020-03-02

## 2020-03-02 RX ORDER — BUPROPION HYDROCHLORIDE 150 MG/1
TABLET, EXTENDED RELEASE ORAL
Qty: 30 TABLET | Refills: 1 | Status: SHIPPED | OUTPATIENT
Start: 2020-03-02

## 2020-03-03 NOTE — PROGRESS NOTES
HPI:   Patient presents with:  Elbow Pain: left elbow      Huma Busby is a 29year old female presenting for:  Elbow pain  -continued pain  -no improvement  -works as stylist thus struggling with ROM  -wearing brace and doing supportive care a the lungs every 4 to 6 hours as needed for Wheezing or Shortness of Breath.  1 Inhaler 1   • LEVOTHYROXINE SODIUM 75 MCG Oral Tab TAKE 1 TABLET BY MOUTH DAILY BEFORE BREAKFAST 90 tablet 0   • Fluticasone Propionate 50 MCG/ACT Nasal Suspension 1 spray by Ollie Grandfather    • Heart Attack Maternal Grandfather    • Cancer Paternal Grandfather    • Diabetes Paternal Grandfather    • Renal Disease Father         stones   • Diabetes Maternal Grandmother    • Diabetes Paternal Grandmother           REVIEW OF SYSTEMS year old female who presents primarily presents for:    Diagnoses and all orders for this visit:    Lateral epicondylitis of left elbow  -     Cancel: ORTHOPEDIC - INTERNAL  -     ORTHOPEDIC - INTERNAL  -continue supportive care  -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        Orders Placed This Encounter      CBC, Platelet, No Differential [E]      Comp Metabolic Panel (14) [E]      Lipid Panel [E]      TSH W Reflex To Free T4 [E]

## 2020-03-07 ENCOUNTER — TELEPHONE (OUTPATIENT)
Dept: OBGYN CLINIC | Facility: CLINIC | Age: 35
End: 2020-03-07

## 2020-03-07 NOTE — TELEPHONE ENCOUNTER
As noted by KAVITA, pt made apt through my chart for pain and \"possible ruptured ovarin cyst\"    Left vm to inform to go to ER over the weekend with worsening s/o also left my chart message.

## 2020-03-08 ENCOUNTER — PATIENT MESSAGE (OUTPATIENT)
Dept: FAMILY MEDICINE CLINIC | Facility: CLINIC | Age: 35
End: 2020-03-08

## 2020-03-09 NOTE — TELEPHONE ENCOUNTER
Results regarding her A1c from 02/25/2019 , vaginal screening from 06/06/2019 were discussed with Lady Fairchildudder, all questions were answered and she verbalized understanding.   Regarding her cough she was recommended not to use any kind of antibiotics and or steroid

## 2020-03-10 ENCOUNTER — TELEPHONE (OUTPATIENT)
Dept: OBGYN CLINIC | Facility: CLINIC | Age: 35
End: 2020-03-10

## 2020-03-10 ENCOUNTER — HOSPITAL ENCOUNTER (OUTPATIENT)
Dept: GENERAL RADIOLOGY | Facility: HOSPITAL | Age: 35
Discharge: HOME | End: 2020-03-10
Attending: PHYSICIAN ASSISTANT
Payer: COMMERCIAL

## 2020-03-10 ENCOUNTER — OFFICE VISIT (OUTPATIENT)
Dept: ORTHOPEDICS CLINIC | Facility: CLINIC | Age: 35
End: 2020-03-10
Payer: COMMERCIAL

## 2020-03-10 ENCOUNTER — PATIENT MESSAGE (OUTPATIENT)
Dept: ORTHOPEDICS CLINIC | Facility: CLINIC | Age: 35
End: 2020-03-10

## 2020-03-10 VITALS — DIASTOLIC BLOOD PRESSURE: 80 MMHG | HEART RATE: 87 BPM | RESPIRATION RATE: 16 BRPM | SYSTOLIC BLOOD PRESSURE: 117 MMHG

## 2020-03-10 DIAGNOSIS — M25.522 LEFT ELBOW PAIN: Primary | ICD-10-CM

## 2020-03-10 DIAGNOSIS — M77.02 MEDIAL EPICONDYLITIS OF LEFT ELBOW: ICD-10-CM

## 2020-03-10 DIAGNOSIS — M25.522 LEFT ELBOW PAIN: ICD-10-CM

## 2020-03-10 DIAGNOSIS — M77.11 LATERAL EPICONDYLITIS OF RIGHT ELBOW: ICD-10-CM

## 2020-03-10 PROCEDURE — 99244 OFF/OP CNSLTJ NEW/EST MOD 40: CPT | Performed by: ORTHOPAEDIC SURGERY

## 2020-03-10 PROCEDURE — 73080 X-RAY EXAM OF ELBOW: CPT | Performed by: PHYSICIAN ASSISTANT

## 2020-03-10 PROCEDURE — 20550 NJX 1 TENDON SHEATH/LIGAMENT: CPT | Performed by: ORTHOPAEDIC SURGERY

## 2020-03-10 RX ORDER — MELOXICAM 15 MG/1
TABLET ORAL
COMMUNITY
Start: 2020-01-31

## 2020-03-10 RX ORDER — LORATADINE 10 MG/1
TABLET ORAL
COMMUNITY
Start: 2020-01-02 | End: 2020-03-12

## 2020-03-10 RX ORDER — TRIAMCINOLONE ACETONIDE 40 MG/ML
40 INJECTION, SUSPENSION INTRA-ARTICULAR; INTRAMUSCULAR ONCE
Status: COMPLETED | OUTPATIENT
Start: 2020-03-10 | End: 2020-03-10

## 2020-03-10 RX ORDER — SUMATRIPTAN 25 MG/1
TABLET, FILM COATED ORAL
COMMUNITY

## 2020-03-10 RX ORDER — BENZONATATE 200 MG/1
CAPSULE ORAL
COMMUNITY

## 2020-03-10 RX ADMIN — TRIAMCINOLONE ACETONIDE 40 MG: 40 INJECTION, SUSPENSION INTRA-ARTICULAR; INTRAMUSCULAR at 12:40:00

## 2020-03-10 NOTE — H&P
NURSING INTAKE COMMENTS: Patient presents with:  Consult: left elbow. pt states she has tennis elbow. it is very painful,went to the ED and has been off of work d/t it.  onset 2/18/20 when it locked up. pt has been wrapping, rest, flexeril, ibuprofen      H then 1 tablet twice daily thereafter.  Start 1wk prior to target quit date 30 tablet 1   • Albuterol Sulfate HFA (PROAIR HFA) 108 (90 Base) MCG/ACT Inhalation Aero Soln Inhale 1-2 puffs into the lungs every 4 to 6 hours as needed for Wheezing or Shortness o Sexual activity: Not Currently        Partners: Male       Review of Systems:  GENERAL: feels generally well, no significant weight loss or weight gain  SKIN: no ulcerated or worrisome skin lesions  EYES:denies blurred vision or double vision  HEENT: rod INJECTION; TENDON ORIGIN/INSERTION  -     triamcinolone acetonide (KENALOG-40) 40 MG/ML injection 40 mg    Lateral epicondylitis of right elbow  -     INJECTION; TENDON ORIGIN/INSERTION  -     triamcinolone acetonide (KENALOG-40) 40 MG/ML injection 40 mg

## 2020-03-10 NOTE — PROGRESS NOTES
Per Janice TALBOT, draw up two syringes of 1 mL of 0.5% Marcaine and 1 mL of Kenalog 40 for injection to medial and lateral epicondyles, left.     Patient provided education handout for cortisone injection.     Patient left office prior to obtaining p

## 2020-03-10 NOTE — TELEPHONE ENCOUNTER
Per SJM, call pt to ask about current symptoms as she is scheduled to come in tomorrow. Unable to leave a message, teodora full.

## 2020-03-11 ENCOUNTER — HOSPITAL ENCOUNTER (OUTPATIENT)
Dept: ULTRASOUND IMAGING | Facility: HOSPITAL | Age: 35
Discharge: HOME | End: 2020-03-11
Attending: ADVANCED PRACTICE MIDWIFE
Payer: COMMERCIAL

## 2020-03-11 ENCOUNTER — OFFICE VISIT (OUTPATIENT)
Dept: OBGYN CLINIC | Facility: CLINIC | Age: 35
End: 2020-03-11
Payer: COMMERCIAL

## 2020-03-11 ENCOUNTER — HOSPITAL ENCOUNTER (OUTPATIENT)
Dept: MRI IMAGING | Age: 35
Discharge: HOME | End: 2020-03-11
Attending: NEUROLOGICAL SURGERY
Payer: COMMERCIAL

## 2020-03-11 VITALS — WEIGHT: 137 LBS | SYSTOLIC BLOOD PRESSURE: 110 MMHG | BODY MASS INDEX: 27 KG/M2 | DIASTOLIC BLOOD PRESSURE: 70 MMHG

## 2020-03-11 DIAGNOSIS — R10.31 ABDOMINAL PAIN, RIGHT LOWER QUADRANT: ICD-10-CM

## 2020-03-11 DIAGNOSIS — R39.9 UTI SYMPTOMS: ICD-10-CM

## 2020-03-11 DIAGNOSIS — M54.12 CERVICAL RADICULOPATHY: ICD-10-CM

## 2020-03-11 DIAGNOSIS — Z11.3 SCREENING EXAMINATION FOR STD (SEXUALLY TRANSMITTED DISEASE): ICD-10-CM

## 2020-03-11 DIAGNOSIS — N89.8 VAGINAL DISCHARGE: ICD-10-CM

## 2020-03-11 DIAGNOSIS — R10.31 ABDOMINAL PAIN, RIGHT LOWER QUADRANT: Primary | ICD-10-CM

## 2020-03-11 DIAGNOSIS — Z32.02 PREGNANCY EXAMINATION OR TEST, NEGATIVE RESULT: ICD-10-CM

## 2020-03-11 LAB
APPEARANCE: CLEAR
CONTROL LINE PRESENT WITH A CLEAR BACKGROUND (YES/NO): YES YES/NO
KIT LOT #: NORMAL NUMERIC
MULTISTIX LOT#: NORMAL NUMERIC
PH, URINE: 5.5 (ref 4.5–8)
PREGNANCY TEST, URINE: NEGATIVE
PROTEIN (URINE DIPSTICK): NEGATIVE MG/DL
SPECIFIC GRAVITY: 1.02 (ref 1–1.03)
URINE-COLOR: YELLOW
UROBILINOGEN,SEMI-QN: 0.2 MG/DL (ref 0–1.9)

## 2020-03-11 PROCEDURE — 93975 VASCULAR STUDY: CPT | Performed by: ADVANCED PRACTICE MIDWIFE

## 2020-03-11 PROCEDURE — 99213 OFFICE O/P EST LOW 20 MIN: CPT | Performed by: ADVANCED PRACTICE MIDWIFE

## 2020-03-11 PROCEDURE — 76775 US EXAM ABDO BACK WALL LIM: CPT | Performed by: ADVANCED PRACTICE MIDWIFE

## 2020-03-11 PROCEDURE — 81002 URINALYSIS NONAUTO W/O SCOPE: CPT | Performed by: ADVANCED PRACTICE MIDWIFE

## 2020-03-11 PROCEDURE — 76830 TRANSVAGINAL US NON-OB: CPT | Performed by: ADVANCED PRACTICE MIDWIFE

## 2020-03-11 PROCEDURE — 72141 MRI NECK SPINE W/O DYE: CPT | Performed by: NEUROLOGICAL SURGERY

## 2020-03-11 PROCEDURE — 76856 US EXAM PELVIC COMPLETE: CPT | Performed by: ADVANCED PRACTICE MIDWIFE

## 2020-03-11 PROCEDURE — 81025 URINE PREGNANCY TEST: CPT | Performed by: ADVANCED PRACTICE MIDWIFE

## 2020-03-11 NOTE — PROGRESS NOTES
S.  Here because she had very faint pregnancy test on Sunday. Has been having constant pain in her left side since Saturday and in the left flank that radiates downward and causes weakness in her leg.   Took 3 amoxicillin on Saturday night and this helped t

## 2020-03-11 NOTE — TELEPHONE ENCOUNTER
From: Manan Terrell  To: Jose Gonzales MD  Sent: 3/10/2020 9:30 PM CDT  Subject: Other    Hi Doctor I was in for my appointment today and I had Cortizone shots done but I was wondering if the top of my arm is supposed to be hurting and in pain.

## 2020-03-12 DIAGNOSIS — E03.8 SUBCLINICAL HYPOTHYROIDISM: ICD-10-CM

## 2020-03-12 LAB
C TRACH DNA SPEC QL NAA+PROBE: NEGATIVE
N GONORRHOEA DNA SPEC QL NAA+PROBE: NEGATIVE

## 2020-03-12 RX ORDER — LORATADINE 10 MG/1
TABLET ORAL
Qty: 90 TABLET | Refills: 0 | Status: SHIPPED | OUTPATIENT
Start: 2020-03-12

## 2020-03-12 RX ORDER — LEVOTHYROXINE SODIUM 0.07 MG/1
TABLET ORAL
Qty: 90 TABLET | Refills: 0 | Status: SHIPPED | OUTPATIENT
Start: 2020-03-12 | End: 2020-04-28

## 2020-03-13 ENCOUNTER — TELEPHONE (OUTPATIENT)
Dept: OBGYN CLINIC | Facility: CLINIC | Age: 35
End: 2020-03-13

## 2020-03-13 NOTE — TELEPHONE ENCOUNTER
----- Message from AMERICO Encinas sent at 3/13/2020 10:45 AM CDT -----  Please place an order for f/u US in mid May to mid June at days 4-5 of cycle and inform pt

## 2020-03-14 LAB
GENITAL VAGINOSIS SCREEN: NEGATIVE
TRICHOMONAS SCREEN: NEGATIVE

## 2020-03-16 ENCOUNTER — TELEPHONE (OUTPATIENT)
Dept: OTHER | Age: 35
End: 2020-03-16

## 2020-03-17 ENCOUNTER — TELEPHONE (OUTPATIENT)
Dept: OBGYN CLINIC | Facility: CLINIC | Age: 35
End: 2020-03-17

## 2020-03-17 NOTE — TELEPHONE ENCOUNTER
RN attempted to reach patient by phone. Pt unavailable and voicemail full. SampleOn Inchart message sent with SJM response. Will await pt response.

## 2020-03-17 NOTE — TELEPHONE ENCOUNTER
Normally gets menses every 11-14th   Pt stated she still hasn't gotten menses, and doesn't know what would be the next steps. Per pt LMP 2/11/20. Last menses was mainly brownish d/c for 4 days in . Had 3 Neg pregnancy tests.   Last time she had menses thr

## 2020-03-17 NOTE — TELEPHONE ENCOUNTER
If the patient is not having a period after the middle of April and the pregnancy tests remain negative she should then make an appointment to be seen. Missing her period for 1-2 months is not a problem that would require a visit at this time.   If she has

## 2020-03-19 ENCOUNTER — PATIENT MESSAGE (OUTPATIENT)
Dept: FAMILY MEDICINE CLINIC | Facility: CLINIC | Age: 35
End: 2020-03-19

## 2020-03-19 NOTE — TELEPHONE ENCOUNTER
From: Mary Terrell   To:  Hoda Sprague MD  Sent: 3/19/2020 10:52 AM CDT  Subject: Other    Dr. Diane Meraz, due to the coronavirus outbreak I have been extremely nervous and scared to be going into work knowing that I have asthma and the re

## 2020-03-20 DIAGNOSIS — J45.998 POST-VIRAL REACTIVE AIRWAY DISEASE: ICD-10-CM

## 2020-03-20 DIAGNOSIS — R09.82 POSTNASAL DRIP: ICD-10-CM

## 2020-03-20 RX ORDER — FLUTICASONE PROPIONATE 50 MCG
1 SPRAY, SUSPENSION (ML) NASAL DAILY
Qty: 1 BOTTLE | Refills: 1 | Status: SHIPPED | OUTPATIENT
Start: 2020-03-20

## 2020-03-20 RX ORDER — ALBUTEROL SULFATE 90 UG/1
1-2 AEROSOL, METERED RESPIRATORY (INHALATION)
Qty: 1 INHALER | Refills: 1 | OUTPATIENT
Start: 2020-03-20

## 2020-03-23 ENCOUNTER — TELEPHONE (OUTPATIENT)
Dept: OBGYN CLINIC | Facility: CLINIC | Age: 35
End: 2020-03-23

## 2020-03-23 NOTE — TELEPHONE ENCOUNTER
LMP was 2/11/2020. That period was dark brown and lighter than usual.  Was seen in the office in March 11/2020 with c/o light menses in February and a positive pregnancy test at home.   Pregnancy test in office was negative as was all other testing for vag

## 2020-04-09 ENCOUNTER — PATIENT MESSAGE (OUTPATIENT)
Dept: FAMILY MEDICINE CLINIC | Facility: CLINIC | Age: 35
End: 2020-04-09

## 2020-04-09 ENCOUNTER — TELEPHONE (OUTPATIENT)
Dept: FAMILY MEDICINE CLINIC | Facility: CLINIC | Age: 35
End: 2020-04-09

## 2020-04-09 NOTE — TELEPHONE ENCOUNTER
From: Mary Terrell   To: Ronen Blue MD  Sent: 4/9/2020 9:03 AM CDT  Subject: Other    Dr Ean Turner good morning, May I please have a doctors note stating that I am available to go back to work pls? Santana Valenzuela  My boss is not allowing me to c

## 2020-04-09 NOTE — TELEPHONE ENCOUNTER
Pt called requesting a note to go back to work, if possible that the doctor can give her one    Please call and advise.

## 2020-04-09 NOTE — TELEPHONE ENCOUNTER
Discussed with Dr. Demetrios Heimlich. [id number] Anette bedoya MD to generate note. Add specifics that patient is asymptomatic and to mention the note given in March requesting for additional precautions.

## 2020-04-09 NOTE — TELEPHONE ENCOUNTER
[address] 1 hour ago (8:55 AM)         Pt called requesting a note to go back to work, if possible that the doctor can give her one     Please call and advise.

## 2020-04-23 ENCOUNTER — LAB ENCOUNTER (OUTPATIENT)
Dept: LAB | Facility: REFERENCE LAB | Age: 35
End: 2020-04-23
Attending: FAMILY MEDICINE
Payer: MEDICAID

## 2020-04-23 DIAGNOSIS — E03.8 SUBCLINICAL HYPOTHYROIDISM: ICD-10-CM

## 2020-04-23 DIAGNOSIS — Z00.00 HEALTHCARE MAINTENANCE: ICD-10-CM

## 2020-04-23 PROCEDURE — 80053 COMPREHEN METABOLIC PANEL: CPT

## 2020-04-23 PROCEDURE — 36415 COLL VENOUS BLD VENIPUNCTURE: CPT

## 2020-04-23 PROCEDURE — 85027 COMPLETE CBC AUTOMATED: CPT

## 2020-04-23 PROCEDURE — 84439 ASSAY OF FREE THYROXINE: CPT

## 2020-04-23 PROCEDURE — 80061 LIPID PANEL: CPT

## 2020-04-23 PROCEDURE — 84481 FREE ASSAY (FT-3): CPT

## 2020-04-23 PROCEDURE — 84443 ASSAY THYROID STIM HORMONE: CPT

## 2020-04-24 ENCOUNTER — PATIENT MESSAGE (OUTPATIENT)
Dept: FAMILY MEDICINE CLINIC | Facility: CLINIC | Age: 35
End: 2020-04-24

## 2020-04-27 NOTE — TELEPHONE ENCOUNTER
Duplicate encounter. Patient sent another message via [employer] re: results. Close encounter.

## 2020-04-28 ENCOUNTER — TELEPHONE (OUTPATIENT)
Dept: FAMILY MEDICINE CLINIC | Facility: CLINIC | Age: 35
End: 2020-04-28

## 2020-04-28 DIAGNOSIS — E87.6 HYPOKALEMIA: Primary | ICD-10-CM

## 2020-04-28 DIAGNOSIS — E03.8 SUBCLINICAL HYPOTHYROIDISM: ICD-10-CM

## 2020-04-28 RX ORDER — LEVOTHYROXINE SODIUM 0.12 MG/1
TABLET ORAL
Qty: 45 TABLET | Refills: 0 | OUTPATIENT
Start: 2020-04-28

## 2020-04-28 RX ORDER — LEVOTHYROXINE SODIUM 0.12 MG/1
62.5 TABLET ORAL
Qty: 30 TABLET | Refills: 0 | Status: SHIPPED | OUTPATIENT
Start: 2020-04-28 | End: 2020-07-29

## 2020-04-28 NOTE — TELEPHONE ENCOUNTER
Patient did call back, results were discussed with her and she verbalized understanding and is aware she has to cut pill in half and that she has to get labs in 6-8 weeks.

## 2020-04-28 NOTE — TELEPHONE ENCOUNTER
----- Message from Dina Li MD sent at 4/28/2020  1:25 PM CDT -----  Please let her know her labs:  1) Minimally low potassium. Increase K+ in diet  2) Her thyroid medication (75mcg) is too strong. We need to decrease the dose.  In past 50mc

## 2020-06-15 ENCOUNTER — TELEPHONE (OUTPATIENT)
Dept: FAMILY MEDICINE CLINIC | Facility: CLINIC | Age: 35
End: 2020-06-15

## 2020-06-15 NOTE — TELEPHONE ENCOUNTER
Patient called states she has been having a cough since March, states she has not been able to get in to see Allergist and would like to see if something else can be prescribed.       Appointment scheduled with Dr Axel Skiff via telehealth for this Thursday 06/1

## 2020-06-18 DIAGNOSIS — E03.8 SUBCLINICAL HYPOTHYROIDISM: ICD-10-CM

## 2020-06-22 RX ORDER — LEVOTHYROXINE SODIUM 0.12 MG/1
TABLET ORAL
Qty: 30 TABLET | Refills: 0 | OUTPATIENT
Start: 2020-06-22

## 2020-06-25 ENCOUNTER — TELEPHONE (OUTPATIENT)
Dept: PULMONOLOGY | Facility: CLINIC | Age: 35
End: 2020-06-25

## 2020-06-25 NOTE — TELEPHONE ENCOUNTER
Pt calling to schedule appt for a cough - covid warning popped up - pt states she tested linda for covid

## 2020-06-25 NOTE — TELEPHONE ENCOUNTER
Spoke with patient. Patient states she has tested negative for COVID recently and would like to schedule appointment. Consult appointment scheduled for 7/22/20 at 4 PM. Verified date, time, location and where to park. Travel screen completed.  Patient Shar Fox

## 2020-07-02 ENCOUNTER — TELEPHONE (OUTPATIENT)
Dept: FAMILY MEDICINE CLINIC | Facility: CLINIC | Age: 35
End: 2020-07-02

## 2020-07-02 NOTE — TELEPHONE ENCOUNTER
[facility] requested medical records (any and all dates) to be faxed over to [phone]. Request sent to medical records department.

## 2020-07-03 ENCOUNTER — TELEPHONE (OUTPATIENT)
Dept: FAMILY MEDICINE CLINIC | Facility: CLINIC | Age: 35
End: 2020-07-03

## 2020-07-03 DIAGNOSIS — E03.8 SUBCLINICAL HYPOTHYROIDISM: ICD-10-CM

## 2020-07-03 NOTE — TELEPHONE ENCOUNTER
Patient paged Dr. Simran Guido, returning phone call. Spoke to patient. Has been having diarrhea since last Friday going 3-4x day, watery, but now a little bit more formed \"like a todd. \"    No fever, no blood in stool.     Her main concern is that she

## 2020-07-03 NOTE — TELEPHONE ENCOUNTER
RN was going to ask additional questions regarding symptoms, but no answer, left detailed voicemail.     Dr. Ellen Huntley is done for the day and given that it will be a weekend, if symptoms persist, and especially that she is having LLQ pain, it would be best

## 2020-07-03 NOTE — TELEPHONE ENCOUNTER
Pt called stating that she's been having diarrhea for the past week . She was already tested last week for COVID and was negative  Pt also stated that shes been having pain on the left side lower abdomen since last week Friday.   She's been taking ibuprofe

## 2020-07-06 RX ORDER — LEVOTHYROXINE SODIUM 0.12 MG/1
TABLET ORAL
Qty: 30 TABLET | Refills: 0 | OUTPATIENT
Start: 2020-07-06

## 2020-07-22 ENCOUNTER — OFFICE VISIT (OUTPATIENT)
Dept: PULMONOLOGY | Facility: CLINIC | Age: 35
End: 2020-07-22
Payer: MEDICAID

## 2020-07-22 VITALS
TEMPERATURE: 98 F | OXYGEN SATURATION: 100 % | BODY MASS INDEX: 27.88 KG/M2 | HEART RATE: 87 BPM | WEIGHT: 142 LBS | SYSTOLIC BLOOD PRESSURE: 102 MMHG | RESPIRATION RATE: 18 BRPM | HEIGHT: 60 IN | DIASTOLIC BLOOD PRESSURE: 70 MMHG

## 2020-07-22 DIAGNOSIS — R05.9 COUGH: Primary | ICD-10-CM

## 2020-07-22 DIAGNOSIS — J45.991 COUGH VARIANT ASTHMA: ICD-10-CM

## 2020-07-22 PROCEDURE — 3078F DIAST BP <80 MM HG: CPT | Performed by: INTERNAL MEDICINE

## 2020-07-22 PROCEDURE — 3074F SYST BP LT 130 MM HG: CPT | Performed by: INTERNAL MEDICINE

## 2020-07-22 PROCEDURE — 3008F BODY MASS INDEX DOCD: CPT | Performed by: INTERNAL MEDICINE

## 2020-07-22 PROCEDURE — 99204 OFFICE O/P NEW MOD 45 MIN: CPT | Performed by: INTERNAL MEDICINE

## 2020-07-22 NOTE — PROGRESS NOTES
[facility] CLINIC [place] MEDICAL OFFICE BUILDING, [facility][1], [facility]    Report of Consultation    Brunilda Terrell  Patient Status:  Outpatient    3/22/1985 MRN [id number]   Location [address] [place] [address], [address] [place] [address], Maternal Grandmother    • Diabetes Paternal Grandmother        Social History  Social History    Tobacco Use      Smoking status: Current Every Day Smoker        Packs/day: 0.50        Years: 12.00        Pack years: 6        Types: Cigarettes      Smokele October 2019 when she was in [facility]  No benefit from Symbicort or albuterol inhaler  No clinical evidence of acute infection  No history for TB or exposures  No pets exposure no significant current occupational exposure  Normal lung exam    ? Reactive airwa

## 2020-07-23 ENCOUNTER — TELEPHONE (OUTPATIENT)
Dept: ORTHOPEDICS CLINIC | Facility: CLINIC | Age: 35
End: 2020-07-23

## 2020-07-23 NOTE — TELEPHONE ENCOUNTER
Pt scheduled self appt with Nereida for 08/03/20 and phone room called stating pt has positive COVID-19 answer and transferring call to triage nurse. Spoke to pt and she states that she has had a cough since 09/2019.  Denies any fever, sore throat, body

## 2020-07-26 ENCOUNTER — HOSPITAL ENCOUNTER (OUTPATIENT)
Dept: GENERAL RADIOLOGY | Age: 35
Discharge: HOME | End: 2020-07-26
Attending: INTERNAL MEDICINE
Payer: MEDICAID

## 2020-07-26 DIAGNOSIS — R05.9 COUGH: ICD-10-CM

## 2020-07-26 PROCEDURE — 71046 X-RAY EXAM CHEST 2 VIEWS: CPT | Performed by: INTERNAL MEDICINE

## 2020-07-27 ENCOUNTER — LAB ENCOUNTER (OUTPATIENT)
Dept: LAB | Age: 35
End: 2020-07-27
Attending: INTERNAL MEDICINE
Payer: MEDICAID

## 2020-07-27 DIAGNOSIS — R05.9 COUGH: ICD-10-CM

## 2020-07-27 DIAGNOSIS — E87.6 HYPOKALEMIA: ICD-10-CM

## 2020-07-27 DIAGNOSIS — J45.991 COUGH VARIANT ASTHMA: ICD-10-CM

## 2020-07-27 DIAGNOSIS — E03.8 SUBCLINICAL HYPOTHYROIDISM: ICD-10-CM

## 2020-07-27 LAB
ALBUMIN SERPL-MCNC: 4.2 G/DL (ref 3.4–5)
ALBUMIN/GLOB SERPL: 1.1 {RATIO} (ref 1–2)
ALP LIVER SERPL-CCNC: 66 U/L (ref 37–98)
ALT SERPL-CCNC: 17 U/L (ref 13–56)
ANION GAP SERPL CALC-SCNC: 3 MMOL/L (ref 0–18)
AST SERPL-CCNC: 11 U/L (ref 15–37)
BASOPHILS # BLD AUTO: 0.07 X10(3) UL (ref 0–0.2)
BASOPHILS NFR BLD AUTO: 1 %
BILIRUB SERPL-MCNC: 0.6 MG/DL (ref 0.1–2)
BUN BLD-MCNC: 14 MG/DL (ref 7–18)
BUN/CREAT SERPL: 15.1 (ref 10–20)
CALCIUM BLD-MCNC: 9.1 MG/DL (ref 8.5–10.1)
CHLORIDE SERPL-SCNC: 109 MMOL/L (ref 98–112)
CO2 SERPL-SCNC: 28 MMOL/L (ref 21–32)
CREAT BLD-MCNC: 0.93 MG/DL (ref 0.55–1.02)
DEPRECATED RDW RBC AUTO: 39.6 FL (ref 35.1–46.3)
EOSINOPHIL # BLD AUTO: 0.3 X10(3) UL (ref 0–0.7)
EOSINOPHIL NFR BLD AUTO: 4.3 %
ERYTHROCYTE [DISTWIDTH] IN BLOOD BY AUTOMATED COUNT: 12.3 % (ref 11–15)
GLOBULIN PLAS-MCNC: 3.7 G/DL (ref 2.8–4.4)
GLUCOSE BLD-MCNC: 89 MG/DL (ref 70–99)
HCT VFR BLD AUTO: 41.5 % (ref 35–48)
HGB BLD-MCNC: 13.8 G/DL (ref 12–16)
IMM GRANULOCYTES # BLD AUTO: 0.02 X10(3) UL (ref 0–1)
IMM GRANULOCYTES NFR BLD: 0.3 %
LYMPHOCYTES # BLD AUTO: 1.8 X10(3) UL (ref 1–4)
LYMPHOCYTES NFR BLD AUTO: 25.8 %
M PROTEIN MFR SERPL ELPH: 7.9 G/DL (ref 6.4–8.2)
MCH RBC QN AUTO: 29 PG (ref 26–34)
MCHC RBC AUTO-ENTMCNC: 33.3 G/DL (ref 31–37)
MCV RBC AUTO: 87.2 FL (ref 80–100)
MONOCYTES # BLD AUTO: 0.56 X10(3) UL (ref 0.1–1)
MONOCYTES NFR BLD AUTO: 8 %
NEUTROPHILS # BLD AUTO: 4.23 X10 (3) UL (ref 1.5–7.7)
NEUTROPHILS # BLD AUTO: 4.23 X10(3) UL (ref 1.5–7.7)
NEUTROPHILS NFR BLD AUTO: 60.6 %
OSMOLALITY SERPL CALC.SUM OF ELEC: 290 MOSM/KG (ref 275–295)
PATIENT FASTING Y/N/NP: YES
PLATELET # BLD AUTO: 295 10(3)UL (ref 150–450)
POTASSIUM SERPL-SCNC: 4.4 MMOL/L (ref 3.5–5.1)
RBC # BLD AUTO: 4.76 X10(6)UL (ref 3.8–5.3)
SODIUM SERPL-SCNC: 140 MMOL/L (ref 136–145)
T3FREE SERPL-MCNC: 2.38 PG/ML (ref 2.4–4.2)
T4 FREE SERPL-MCNC: 1.2 NG/DL (ref 0.8–1.7)
TSI SER-ACNC: 0.35 MIU/ML (ref 0.36–3.74)
WBC # BLD AUTO: 7 X10(3) UL (ref 4–11)

## 2020-07-27 PROCEDURE — 36415 COLL VENOUS BLD VENIPUNCTURE: CPT

## 2020-07-27 PROCEDURE — 84481 FREE ASSAY (FT-3): CPT

## 2020-07-27 PROCEDURE — 84439 ASSAY OF FREE THYROXINE: CPT

## 2020-07-27 PROCEDURE — 86769 SARS-COV-2 COVID-19 ANTIBODY: CPT

## 2020-07-27 PROCEDURE — 85025 COMPLETE CBC W/AUTO DIFF WBC: CPT

## 2020-07-27 PROCEDURE — 80053 COMPREHEN METABOLIC PANEL: CPT

## 2020-07-27 PROCEDURE — 84443 ASSAY THYROID STIM HORMONE: CPT

## 2020-07-28 LAB — SARS-COV-2 IGG SERPLBLD QL IA.RAPID: NEGATIVE

## 2020-07-29 ENCOUNTER — TELEPHONE (OUTPATIENT)
Dept: FAMILY MEDICINE CLINIC | Facility: CLINIC | Age: 35
End: 2020-07-29

## 2020-07-29 NOTE — TELEPHONE ENCOUNTER
Called patient,  verified, results below given.  Patient is aware of new dosage and will recheck levels in 6-8 weeks

## 2020-07-29 NOTE — TELEPHONE ENCOUNTER
Patient is calling to get lab results. She states she is cutting in half the Thyroid medication FYI for doctor. Please advice with results.

## 2020-07-29 NOTE — TELEPHONE ENCOUNTER
----- Message from Nixon Goldman MD sent at 7/29/2020  4:39 PM CDT -----  Please let her know:    Her thyroid medication (62.5mcg) is too strong. We will decrease back to 50mcg.  If its too low again like last time we will have to do alternating

## 2020-07-30 ENCOUNTER — TELEPHONE (OUTPATIENT)
Dept: PULMONOLOGY | Facility: CLINIC | Age: 35
End: 2020-07-30

## 2020-07-30 NOTE — TELEPHONE ENCOUNTER
----- Message from Mc Santana MD sent at 7/28/2020 10:22 PM CDT -----  Please inform the pt that her CXR is normal   COVID 19 test is negative   Labs WNL

## 2020-07-31 ENCOUNTER — LAB ENCOUNTER (OUTPATIENT)
Dept: LAB | Facility: HOSPITAL | Age: 35
End: 2020-07-31
Attending: INTERNAL MEDICINE
Payer: MEDICAID

## 2020-07-31 DIAGNOSIS — R05.9 COUGH: ICD-10-CM

## 2020-07-31 DIAGNOSIS — J45.991 COUGH VARIANT ASTHMA: ICD-10-CM

## 2020-08-01 LAB — SARS-COV-2 RNA RESP QL NAA+PROBE: NOT DETECTED

## 2020-08-03 ENCOUNTER — HOSPITAL ENCOUNTER (OUTPATIENT)
Dept: RESPIRATORY THERAPY | Facility: HOSPITAL | Age: 35
End: 2020-08-03
Attending: INTERNAL MEDICINE
Payer: COMMERCIAL

## 2020-08-03 ENCOUNTER — OFFICE VISIT (OUTPATIENT)
Dept: ORTHOPEDICS CLINIC | Facility: CLINIC | Age: 35
End: 2020-08-03
Payer: COMMERCIAL

## 2020-08-03 VITALS
HEIGHT: 60 IN | BODY MASS INDEX: 27.88 KG/M2 | SYSTOLIC BLOOD PRESSURE: 103 MMHG | HEART RATE: 68 BPM | WEIGHT: 142 LBS | DIASTOLIC BLOOD PRESSURE: 77 MMHG

## 2020-08-03 DIAGNOSIS — M77.12 LATERAL EPICONDYLITIS, LEFT ELBOW: Primary | ICD-10-CM

## 2020-08-03 PROCEDURE — 3078F DIAST BP <80 MM HG: CPT | Performed by: ORTHOPAEDIC SURGERY

## 2020-08-03 PROCEDURE — 3074F SYST BP LT 130 MM HG: CPT | Performed by: ORTHOPAEDIC SURGERY

## 2020-08-03 PROCEDURE — 3008F BODY MASS INDEX DOCD: CPT | Performed by: ORTHOPAEDIC SURGERY

## 2020-08-03 PROCEDURE — 99213 OFFICE O/P EST LOW 20 MIN: CPT | Performed by: ORTHOPAEDIC SURGERY

## 2020-08-03 PROCEDURE — 20605 DRAIN/INJ JOINT/BURSA W/O US: CPT | Performed by: ORTHOPAEDIC SURGERY

## 2020-08-03 PROCEDURE — 99202 OFFICE O/P NEW SF 15 MIN: CPT | Performed by: ORTHOPAEDIC SURGERY

## 2020-08-03 RX ORDER — FAMOTIDINE 20 MG/1
1 TABLET ORAL 2 TIMES DAILY
COMMUNITY
Start: 2020-06-17

## 2020-08-03 RX ORDER — TRIAMCINOLONE ACETONIDE 40 MG/ML
40 INJECTION, SUSPENSION INTRA-ARTICULAR; INTRAMUSCULAR ONCE
Status: COMPLETED | OUTPATIENT
Start: 2020-08-03 | End: 2020-08-03

## 2020-08-03 RX ADMIN — TRIAMCINOLONE ACETONIDE 40 MG: 40 INJECTION, SUSPENSION INTRA-ARTICULAR; INTRAMUSCULAR at 10:15:00

## 2020-08-03 NOTE — PROGRESS NOTES
NURSING INTAKE COMMENTS: Patient presents with:  Elbow Pain: tennis elbow L>R pain 7/10 in Left c/o joint clicking      HPI: This 28year old female presents today recurrence of left lateral epicondylitis.   The last note said it was the right side but she Aero Soln Inhale 1-2 puffs into the lungs every 4 to 6 hours as needed for Wheezing or Shortness of Breath. 1 Inhaler 1   • Elastic Bandages & Supports (TENNIS ELBOW NEOPRENE BRACE) Does not apply Misc 1 Package by Does not apply route daily.  1 each 0   • gain  SKIN: no ulcerated or worrisome skin lesions  EYES:denies blurred vision or double vision  HEENT: denies new nasal congestion, sinus pain or ST  LUNGS: denies shortness of breath  CARDIOVASCULAR: denies chest pain  GI: no hematemesis, no worsening he fracture or visible bony lesion. CONCLUSION:  Negative for radiographically evident acute intrathoracic process.     Dictated by (CST): Enio Galarza MD on 7/26/2020 at 1:38 PM     Finalized by (CST): Enio Galarza MD on 7/26/2020 at 1:38 PM

## 2020-08-03 NOTE — TELEPHONE ENCOUNTER
Spoke with patient, informed her of Dr. Yobani Gillis message below. Patient states she had PFT scheduled for today but had to cancel because it was not covered by insurance. Dr. Venkat Braun- Please advise.

## 2020-08-04 NOTE — TELEPHONE ENCOUNTER
Spoke with patient and informed her of Dr. Zahida Vidales message below. Patient inquiring about a pulmonary doctor that is covered under her insurance. Advised patient to contact insurance company and to aslo ask about pulmonary function test. Patient voiced understanding and will call pulmo office back with update.

## 2020-08-06 ENCOUNTER — TELEPHONE (OUTPATIENT)
Dept: PULMONOLOGY | Facility: CLINIC | Age: 35
End: 2020-08-06

## 2020-08-06 NOTE — TELEPHONE ENCOUNTER
Patient states she is unable to do her pulmonary function test because she was informed we do not take her current [employer]. I do not have an up to date insurance card on file for her. I emailed and asked her to send us a picture of the front and back of her new card to investigate further.

## 2020-09-29 DIAGNOSIS — J20.9 BRONCHITIS WITH BRONCHOSPASM: ICD-10-CM

## 2020-09-29 DIAGNOSIS — J45.998 POST-VIRAL REACTIVE AIRWAY DISEASE: ICD-10-CM

## 2020-09-29 RX ORDER — DILTIAZEM HYDROCHLORIDE 60 MG/1
TABLET, FILM COATED ORAL
Refills: 0 | OUTPATIENT
Start: 2020-09-29

## 2023-10-24 NOTE — TELEPHONE ENCOUNTER
Pt is not getting her period   Wants to speak to a nurse Plastic Surgeon Procedure Text (A): After obtaining clear surgical margins the patient was sent to plastics for surgical repair.  The patient understands they will receive post-surgical care and follow-up from the referring physician's office.

## 2024-03-12 DIAGNOSIS — R51.9 SCALP PAIN: Primary | ICD-10-CM

## (undated) NOTE — LETTER
10/17/2018              [address]         Dear Roberto Pederson,    It was a pleasure to see you. Your vaginal culture was negative. There is no need for further testing at this time.   I look forward to seein

## (undated) NOTE — LETTER
Date: 6/4/2019    Patient Name: Dick Ibarra          To Whom it may concern: The above patient is seen at the [facility] for treatment of a medical condition.     Please accommodate the following restrictions at work: avoid use of v

## (undated) NOTE — LETTER
1/28/2019              [address]         Dear Reece Lindsey,    It was a pleasure to see you.   Your recent vaginal culture was negative/normal.  This test screened for the presence of bacterial vaginosis, tr

## (undated) NOTE — ED AVS SNAPSHOT
Aziza Mattson   MRN: [id number]    Department:  [facility] Emergency Department   Date of Visit:  6/6/2019           Disclosure     Insurance plans vary and the physician(s) referred by the ER may not be covered by your plan.  Please con CARE PHYSICIAN AT ONCE OR RETURN IMMEDIATELY TO THE EMERGENCY DEPARTMENT. If you have been prescribed any medication(s), please fill your prescription right away and begin taking the medication(s) as directed.   If you believe that any of the medications

## (undated) NOTE — Clinical Note
4/3/2017              [address] [facility]         Dear Asmita Al,    It was a pleasure to see you. Your PAP test was normal.  There is no need for further testing at this time.   I look forward to seeing y

## (undated) NOTE — LETTER
10/17/2018              [address]         Dear Ollie Alicia,    It was a pleasure to see you. Your PAP test was normal.  There is no need for further testing at this time.   I look forward to seeing you at

## (undated) NOTE — MR AVS SNAPSHOT
[facility]  [address] [facility] [place] [address]  [phone]               Thank you for choosing us for your health care visit with NICOLE Doll. We are glad to serve you and happy to provide you with this summary of your visit. 4. Mood changes – take a multivitamin daily. If persists past he third pack of pills discuss this with your health care provider. What can decrease the effectiveness of the pill  1. Forgetting to take the pill.   2. An illness that causes vomiting and/o Take 1 tablet (0.5 mg total) by mouth 2 (two) times daily as needed for Anxiety.    Commonly known as:  XANAX           methylPREDNISolone 4 MG Tbpk   Use as directed for 6 days   Commonly known as:  Pedro Nielsen     Call the he

## (undated) NOTE — LETTER
AUTHORIZATION FOR SURGICAL OPERATION OR OTHER PROCEDURE    1.  I hereby authorize Darnell TALBOT, and [facility], [facility] staff assigned to my case to perform the following operation and/or procedure at the [facility], [facility]:    Cortisone injection Left e Time:  ________ A. M.  P.M.        Patient Name:  ______________________________________________________  (please print)      Patient signature:  ___________________________________________________             Relationship to Patient:

## (undated) NOTE — LETTER
Date: 4/9/2020    Patient Name: Keven Terrell           To Whom It May Concern: This letter has been written at the patient's request.     This patient is under my care.   She has not been evaluated in the office nor via telephone visit; she nilesh

## (undated) NOTE — LETTER
Date: 7/3/2020    Patient Name: Alvin Medina   : 1985        To Whom It May Concern:     This letter has been written at the patient's request. The above patient was triaged at the [facility] for treatment of a medical condit

## (undated) NOTE — LETTER
Date: 3/19/2020    Patient Name: Rancho Moreno  Xander           To Whom it may concern: The patient is under my care and has a medical history of asthma and allergies.   Due to the current outbreak of COVID-19, please consider providing the patient strong

## (undated) NOTE — LETTER
AUTHORIZATION FOR SURGICAL OPERATION OR OTHER PROCEDURE    1.  I hereby authorize DAHIANA Stinson, and [facility] staff assigned to my case to perform the following operation and/or procedure at the [facility]:    _________________________ Date:  ______/______/_____                    Time:  ________ A. M.  P.M.        Patient Name:  ______________________________________________________  (please print)      Patient signature:  ___________________________________________________             Re

## (undated) NOTE — LETTER
Date: 3/2/2020    Patient Name: Jax Terrell         To Whom it may concern: This letter has been written at the patient's request. The above patient was seen at the [facility] for treatment of a medical condition.     This patient

## (undated) NOTE — LETTER
3/10/2020          To Whom It May Concern:    Alma Wells is currently under my medical care. Please excuse her from work for 2 weeks. She may return to work on 3/23/2020 with no restrictions.    If you require additional information please contac

## (undated) NOTE — LETTER
AUTHORIZATION FOR SURGICAL OPERATION OR OTHER PROCEDURE    1.  I hereby authorize DAHIANA Dorado, and [facility] assigned to my case to perform the following operation and/or procedure at the [facility]:    _________________________ Date:  ______/______/_____                    Time:  ________ A. M.  P.M.        Patient Name:  ______________________________________________________  (please print)      Patient signature:  ___________________________________________________             Re

## (undated) NOTE — LETTER
Date & Time: 6/6/2019, 6:05 PM  Patient: Sree Haquegilmardobhanu  Encounter Provider(s):    DAHIANA Ibrahim       To Whom It May Concern:    Liz Fontana was seen and treated in our department on 6/6/2019. She may return to work on 6/9/2019.     If y

## (undated) NOTE — LETTER
8/2/2019              Ewa Neri Sblendorio        [address]         Dear Georgie Anne records indicate that the pelvic ultrasound test ordered for you by Valeriano Adames. Rich Merrill MD  has not been done.   If you have, in fact, a